# Patient Record
Sex: FEMALE | Race: WHITE | NOT HISPANIC OR LATINO | Employment: FULL TIME | URBAN - NONMETROPOLITAN AREA
[De-identification: names, ages, dates, MRNs, and addresses within clinical notes are randomized per-mention and may not be internally consistent; named-entity substitution may affect disease eponyms.]

---

## 2019-06-14 ENCOUNTER — OFFICE VISIT (OUTPATIENT)
Dept: URGENT CARE | Facility: CLINIC | Age: 29
End: 2019-06-14
Payer: COMMERCIAL

## 2019-06-14 VITALS
BODY MASS INDEX: 39.17 KG/M2 | WEIGHT: 264.44 LBS | TEMPERATURE: 98.6 F | RESPIRATION RATE: 14 BRPM | OXYGEN SATURATION: 96 % | SYSTOLIC BLOOD PRESSURE: 124 MMHG | DIASTOLIC BLOOD PRESSURE: 76 MMHG | HEIGHT: 69 IN | HEART RATE: 102 BPM

## 2019-06-14 DIAGNOSIS — J06.9 VIRAL URI WITH COUGH: ICD-10-CM

## 2019-06-14 PROCEDURE — 99203 OFFICE O/P NEW LOW 30 MIN: CPT | Performed by: FAMILY MEDICINE

## 2019-06-14 NOTE — PROGRESS NOTES
"Subjective:      Kelsey Penrose is a 28 y.o. female who presents with Pharyngitis            This is a new problem  28-year-old female presenting with 4-day history of cough, congestion, sore throat, some hoarseness this morning.  No fever reported.  She has not been using any over-the-counter medication        Review of Systems   All other systems reviewed and are negative.         Objective:     /76 (BP Location: Right arm, Patient Position: Sitting, BP Cuff Size: Large adult)   Pulse (!) 102   Temp 37 °C (98.6 °F) (Temporal)   Resp 14   Ht 1.74 m (5' 8.5\")   Wt 120 kg (264 lb 7.1 oz)   SpO2 96%   BMI 39.62 kg/m²      Physical Exam   Constitutional: She is oriented to person, place, and time. She appears well-developed and well-nourished.  Non-toxic appearance. No distress.   HENT:   Head: Normocephalic and atraumatic.   Right Ear: Tympanic membrane, external ear and ear canal normal.   Left Ear: Tympanic membrane, external ear and ear canal normal.   Nose: No rhinorrhea.   Mouth/Throat: Uvula is midline and oropharynx is clear and moist. No oral lesions. No uvula swelling. No oropharyngeal exudate, posterior oropharyngeal edema, posterior oropharyngeal erythema or tonsillar abscesses. No tonsillar exudate.   Eyes: Conjunctivae are normal.   Neck: Neck supple.   Cardiovascular: Regular rhythm.  Exam reveals no gallop and no friction rub.    No murmur heard.  Pulmonary/Chest: Effort normal. No stridor. No respiratory distress. She has no wheezes. She has no rales.   Lymphadenopathy:     She has no cervical adenopathy.   Neurological: She is alert and oriented to person, place, and time.   Skin: Skin is warm. No rash noted. She is not diaphoretic. No erythema. No pallor.   Psychiatric: She has a normal mood and affect.               Assessment/Plan:     1. Viral URI with cough    We discussed she has a viral illness and treatment is supportive.  Continue symptomatic care  Plan per orders and " instructions  Warning signs reviewed

## 2019-06-18 ENCOUNTER — OFFICE VISIT (OUTPATIENT)
Dept: URGENT CARE | Facility: CLINIC | Age: 29
End: 2019-06-18
Payer: COMMERCIAL

## 2019-06-18 VITALS
RESPIRATION RATE: 16 BRPM | OXYGEN SATURATION: 93 % | BODY MASS INDEX: 40.01 KG/M2 | DIASTOLIC BLOOD PRESSURE: 80 MMHG | HEART RATE: 96 BPM | HEIGHT: 68 IN | WEIGHT: 264 LBS | TEMPERATURE: 97.7 F | SYSTOLIC BLOOD PRESSURE: 114 MMHG

## 2019-06-18 DIAGNOSIS — J98.8 RTI (RESPIRATORY TRACT INFECTION): ICD-10-CM

## 2019-06-18 DIAGNOSIS — Z86.59 HISTORY OF BIPOLAR DISORDER: ICD-10-CM

## 2019-06-18 LAB
INT CON NEG: NORMAL
INT CON POS: NORMAL
S PYO AG THROAT QL: NEGATIVE

## 2019-06-18 PROCEDURE — 87880 STREP A ASSAY W/OPTIC: CPT | Performed by: FAMILY MEDICINE

## 2019-06-18 PROCEDURE — 99214 OFFICE O/P EST MOD 30 MIN: CPT | Performed by: FAMILY MEDICINE

## 2019-06-18 RX ORDER — BENZONATATE 100 MG/1
200 CAPSULE ORAL 3 TIMES DAILY PRN
Qty: 30 CAP | Refills: 1 | Status: SHIPPED | OUTPATIENT
Start: 2019-06-18 | End: 2021-06-22

## 2019-06-18 RX ORDER — PREDNISONE 20 MG/1
40 TABLET ORAL EVERY MORNING
Qty: 12 TAB | Refills: 0 | Status: SHIPPED | OUTPATIENT
Start: 2019-06-18 | End: 2019-06-18

## 2019-06-18 RX ORDER — AZITHROMYCIN 250 MG/1
TABLET, FILM COATED ORAL
Qty: 6 TAB | Refills: 0 | Status: SHIPPED | OUTPATIENT
Start: 2019-06-18 | End: 2019-06-18

## 2019-06-18 RX ORDER — AMOXICILLIN 875 MG/1
875 TABLET, COATED ORAL EVERY 12 HOURS
Qty: 20 TAB | Refills: 0 | Status: SHIPPED | OUTPATIENT
Start: 2019-06-18 | End: 2019-06-28

## 2019-06-18 ASSESSMENT — ENCOUNTER SYMPTOMS: SORE THROAT: 1

## 2019-06-18 NOTE — PROGRESS NOTES
"Subjective:      Kelsey Penrose is a 28 y.o. female who presents with Pharyngitis (was told that if she was not better by today to comne back, symptoms worsening, )      - This is a pleasant and non toxic appearing 28 y.o. female with c/o 1 wk cough/sinus. No NVFC. otc meds not helping           ALLERGIES:  Patient has no known allergies.     PMH:  History reviewed. No pertinent past medical history.     PSH:  History reviewed. No pertinent surgical history.    MEDS:    Current Outpatient Prescriptions:   •  benzonatate (TESSALON) 100 MG Cap, Take 2 Caps by mouth 3 times a day as needed for Cough., Disp: 30 Cap, Rfl: 1  •  amoxicillin (AMOXIL) 875 MG tablet, Take 1 Tab by mouth every 12 hours for 10 days., Disp: 20 Tab, Rfl: 0  •  sertraline (ZOLOFT) 50 MG Tab, Take 50 mg by mouth every day., Disp: , Rfl:     ** I have documented what I find to be significant in regards to past medical, social, family and surgical history  in my HPI or under PMH/PSH/FH review section, otherwise it is contributory **           HPI    Review of Systems   HENT: Positive for sore throat.    All other systems reviewed and are negative.         Objective:     /80   Pulse 96   Temp 36.5 °C (97.7 °F)   Resp 16   Ht 1.727 m (5' 8\")   Wt 119.7 kg (264 lb)   SpO2 93%   BMI 40.14 kg/m²      Physical Exam   Constitutional: She appears well-developed. No distress.   HENT:   Head: Normocephalic and atraumatic.   Mouth/Throat: Oropharynx is clear and moist.   Eyes: Conjunctivae are normal.   Neck: Neck supple.   Cardiovascular: Regular rhythm.    No murmur heard.  Pulmonary/Chest: Effort normal. No respiratory distress.   Neurological: She is alert. She exhibits normal muscle tone.   Skin: Skin is warm and dry.   Psychiatric: She has a normal mood and affect. Judgment normal.   Nursing note and vitals reviewed.              Assessment/Plan:         1. RTI (respiratory tract infection)  POCT Rapid Strep A    benzonatate (TESSALON) 100 " MG Cap    amoxicillin (AMOXIL) 875 MG tablet    DISCONTINUED: azithromycin (ZITHROMAX) 250 MG Tab    DISCONTINUED: predniSONE (DELTASONE) 20 MG Tab   2. History of bipolar disorder               Dx & d/c instructions discussed w/ patient and/or family members.     Follow up with PCP (or here if PCP unavailable) in 2-3 days if symptoms not improving, ER if feeling/getting worse.    Any realistic and/or common medication side effects that may have been given today(i.e. Rash, GI upset/constipation, sedation, elevation of BP or blood sugars) reviewed.     Patient left in stable condition

## 2021-06-22 ENCOUNTER — OFFICE VISIT (OUTPATIENT)
Dept: URGENT CARE | Facility: CLINIC | Age: 31
End: 2021-06-22
Payer: COMMERCIAL

## 2021-06-22 VITALS
BODY MASS INDEX: 41.57 KG/M2 | HEART RATE: 83 BPM | OXYGEN SATURATION: 96 % | DIASTOLIC BLOOD PRESSURE: 86 MMHG | RESPIRATION RATE: 16 BRPM | SYSTOLIC BLOOD PRESSURE: 124 MMHG | HEIGHT: 69 IN | TEMPERATURE: 97.6 F | WEIGHT: 280.7 LBS

## 2021-06-22 DIAGNOSIS — G43.819 OTHER MIGRAINE WITHOUT STATUS MIGRAINOSUS, INTRACTABLE: Primary | ICD-10-CM

## 2021-06-22 PROBLEM — R42 VERTIGO: Status: ACTIVE | Noted: 2020-11-09

## 2021-06-22 PROBLEM — G43.119 INTRACTABLE MIGRAINE WITH AURA WITHOUT STATUS MIGRAINOSUS: Status: ACTIVE | Noted: 2020-11-09

## 2021-06-22 LAB — GLUCOSE BLD-MCNC: 98 MG/DL (ref 70–100)

## 2021-06-22 PROCEDURE — 99214 OFFICE O/P EST MOD 30 MIN: CPT | Performed by: PHYSICIAN ASSISTANT

## 2021-06-22 PROCEDURE — 82962 GLUCOSE BLOOD TEST: CPT | Performed by: PHYSICIAN ASSISTANT

## 2021-06-22 RX ORDER — KETOROLAC TROMETHAMINE 30 MG/ML
30 INJECTION, SOLUTION INTRAMUSCULAR; INTRAVENOUS ONCE
Status: DISCONTINUED | OUTPATIENT
Start: 2021-06-22 | End: 2021-06-22

## 2021-06-22 ASSESSMENT — ENCOUNTER SYMPTOMS
HEADACHES: 1
VOMITING: 1
COUGH: 0
PHOTOPHOBIA: 1
VISUAL CHANGE: 0
NAUSEA: 1
FEVER: 0
DIZZINESS: 1
BLURRED VISION: 0
SORE THROAT: 0
MIGRAINE HEADACHES: 1

## 2021-06-22 NOTE — PROGRESS NOTES
Subjective:   Kelsey Penrose is a 30 y.o. female who presents for Headache (body aches, weakness, emesis x  3 days ago)        Headache   This is a new problem. Episode onset: 3 days  The problem occurs constantly. The problem has been unchanged. The pain is located in the bilateral region. The quality of the pain is described as band-like. Associated symptoms include dizziness, nausea, photophobia and vomiting (x 1 episode ). Pertinent negatives include no blurred vision, coughing, fever, muscle aches, phonophobia, sore throat or visual change. Treatments tried: ibuprofen  Her past medical history is significant for migraine headaches.      Pt had a dizziness episode last week she contributed to heat and menses.     The pt is concerned about her glucose after taking Risperdal previously. She d/c medication due to polydipsia concern for elevation in glucose. She would like glucose checked today. She continues to have intermittent polydipsia and recently had weight gain.     She does not currently have a pcp.     Hx of bipolar disorder     Review of Systems   Constitutional: Negative for fever.   HENT: Negative for sore throat.    Eyes: Positive for photophobia. Negative for blurred vision.   Respiratory: Negative for cough.    Gastrointestinal: Positive for nausea and vomiting (x 1 episode ).   Neurological: Positive for dizziness and headaches.       PMH:  has no past medical history on file.  MEDS:   Current Outpatient Medications:   •  sertraline (ZOLOFT) 50 MG Tab, Take 50 mg by mouth every day., Disp: , Rfl:   •  Multiple Vitamin (MULTIVITAMIN ADULT PO), Take  by mouth., Disp: , Rfl:   ALLERGIES:   Allergies   Allergen Reactions   • Avocado      swelling     SURGHX: No past surgical history on file.  SOCHX:  reports that she quit smoking about 2 years ago. Her smoking use included cigarettes. She smoked 0.25 packs per day. She uses smokeless tobacco. She reports current alcohol use. She reports that she does  "not use drugs.  No family history on file.     Objective:   /86 (BP Location: Right arm, Patient Position: Sitting)   Pulse 83   Temp 36.4 °C (97.6 °F) (Temporal)   Resp 16   Ht 1.753 m (5' 9\")   Wt (!) 127 kg (280 lb 11.2 oz)   SpO2 96%   BMI 41.45 kg/m²     Physical Exam  Vitals reviewed.   Constitutional:       General: She is not in acute distress.     Appearance: Normal appearance. She is well-developed. She is not ill-appearing.   HENT:      Head: Normocephalic and atraumatic.   Eyes:      General: No visual field deficit.     Extraocular Movements: Extraocular movements intact.      Pupils: Pupils are equal, round, and reactive to light.   Neck:      Trachea: No tracheal deviation.   Pulmonary:      Effort: Pulmonary effort is normal.   Musculoskeletal:      Cervical back: Normal range of motion and neck supple. No rigidity.   Skin:     General: Skin is warm and dry.      Capillary Refill: Capillary refill takes less than 2 seconds.   Neurological:      General: No focal deficit present.      Mental Status: She is alert and oriented to person, place, and time.      Cranial Nerves: Cranial nerves are intact. No cranial nerve deficit or facial asymmetry.      Sensory: Sensation is intact. No sensory deficit.      Motor: Motor function is intact. No weakness, abnormal muscle tone or pronator drift.      Coordination: Coordination is intact. Coordination normal. Finger-Nose-Finger Test normal.      Gait: Gait is intact. Gait normal.   Psychiatric:         Mood and Affect: Mood normal.         Behavior: Behavior normal.           Assessment/Plan:     1. Other migraine without status migrainosus, intractable  AMB REFERRAL TO ESTABLISH WITH [facility] PCP    POCT Glucose    DISCONTINUED: ketorolac (TORADOL) injection 30 mg     Fasting POCT glucose: 98    Neurologically intact.  Recommended Toradol IM in clinic patient declines medication today.  She is does have a history of multiple drug reactions " associated with her bipolar disorder.  Recommended increasing fluids, well-balanced diet, sleep hygiene.  She can continue ibuprofen/Tylenol.  Migraine educational handout provided.    Follow-up with primary care provider within 7-10 days, referral placed.  If symptoms worsen or persist patient can return to clinic for reevaluation. Patient confirmed understanding of information.    My total time spent caring for the patient on the day of the encounter was 30 minutes.   This does not include time spent on separately billable procedures/tests.      Please note that this dictation was created using voice recognition software. I have made every reasonable attempt to correct obvious errors, but I expect that there are errors of grammar and possibly content that I did not discover before finalizing the note.

## 2022-01-23 ENCOUNTER — OFFICE VISIT (OUTPATIENT)
Dept: URGENT CARE | Facility: CLINIC | Age: 32
End: 2022-01-23
Payer: COMMERCIAL

## 2022-01-23 VITALS
TEMPERATURE: 98.4 F | RESPIRATION RATE: 20 BRPM | SYSTOLIC BLOOD PRESSURE: 124 MMHG | BODY MASS INDEX: 40.73 KG/M2 | HEART RATE: 88 BPM | HEIGHT: 69 IN | OXYGEN SATURATION: 100 % | DIASTOLIC BLOOD PRESSURE: 68 MMHG | WEIGHT: 275 LBS

## 2022-01-23 DIAGNOSIS — U07.1 COVID-19 VIRUS INFECTION: ICD-10-CM

## 2022-01-23 DIAGNOSIS — R42 DIZZY: ICD-10-CM

## 2022-01-23 PROCEDURE — 99213 OFFICE O/P EST LOW 20 MIN: CPT | Mod: CS | Performed by: INTERNAL MEDICINE

## 2022-01-23 ASSESSMENT — ENCOUNTER SYMPTOMS
COUGH: 0
FEVER: 0
SHORTNESS OF BREATH: 1
VOMITING: 0
WHEEZING: 0
CHILLS: 0
NAUSEA: 0

## 2022-01-23 NOTE — PROGRESS NOTES
Chief Complaint:      HPI:      Kelsey Penrose is a 31 y.o. female with history of positive COVID test.  She reports that she tested positive after going to a drive-through testing center in [place] last Thursday.  She is reporting dizziness when she is speaking for a long time and standing up.  She denies shortness of breath at rest.  She denies cough, she denies chest pain, denies diarrhea, nausea, vomiting and sore throat.  Her vitals today are normal oxygen is 100% and she is not tachycardic or tachypneic and blood pressure is normal.  She has a history of bipolar disorder and obesity but no history of diabetes, chronic kidney disease, chronic lung disease and cancer.        ROS:   Review of Systems   Constitutional: Negative for chills and fever.   Respiratory: Positive for shortness of breath. Negative for cough and wheezing.    Gastrointestinal: Negative for nausea and vomiting.        Past Medical History:  She   Patient Active Problem List    Diagnosis Date Noted   • Vertigo 2020   • Intractable migraine with aura without status migrainosus 2020   • History of bipolar disorder 2019     Past Surgical History:  She History reviewed. No pertinent surgical history.   Allergies:  She Avocado   Current Medications:  She   Current Outpatient Medications:   •  sertraline (ZOLOFT) 50 MG Tab, Take 50 mg by mouth every day., Disp: , Rfl:   •  Multiple Vitamin (MULTIVITAMIN ADULT PO), Take  by mouth. (Patient not taking: Reported on 2022), Disp: , Rfl:   Social History:  She   Social History     Socioeconomic History   • Marital status: Single     Spouse name: Not on file   • Number of children: Not on file   • Years of education: Not on file   • Highest education level: Not on file   Occupational History   • Not on file   Tobacco Use   • Smoking status: Former Smoker     Packs/day: 0.25     Types: Cigarettes     Quit date: 2019     Years since quittin.7   • Smokeless tobacco: Current User  "  Vaping Use   • Vaping Use: Never used   Substance and Sexual Activity   • Alcohol use: Not Currently     Comment: OCC   • Drug use: No   • Sexual activity: Not on file   Other Topics Concern   • Not on file   Social History Narrative   • Not on file     Social Determinants of Health     Financial Resource Strain:    • Difficulty of Paying Living Expenses: Not on file   Food Insecurity:    • Worried About Running Out of Food in the Last Year: Not on file   • Ran Out of Food in the Last Year: Not on file   Transportation Needs:    • Lack of Transportation (Medical): Not on file   • Lack of Transportation (Non-Medical): Not on file   Physical Activity:    • Days of Exercise per Week: Not on file   • Minutes of Exercise per Session: Not on file   Stress:    • Feeling of Stress : Not on file   Social Connections:    • Frequency of Communication with Friends and Family: Not on file   • Frequency of Social Gatherings with Friends and Family: Not on file   • Attends [employer] Services: Not on file   • Active Member of Clubs or Organizations: Not on file   • Attends Club or Organization Meetings: Not on file   • Marital Status: Not on file   Intimate Partner Violence:    • Fear of Current or Ex-Partner: Not on file   • Emotionally Abused: Not on file   • Physically Abused: Not on file   • Sexually Abused: Not on file   Housing Stability:    • Unable to Pay for Housing in the Last Year: Not on file   • Number of Places Lived in the Last Year: Not on file   • Unstable Housing in the Last Year: Not on file      Family History:   Her History reviewed. No pertinent family history.     PHYSICAL EXAM:    Vital signs: /68 (BP Location: Left arm, Patient Position: Sitting, BP Cuff Size: Adult long)   Pulse 88   Temp 36.9 °C (98.4 °F) (Temporal)   Resp 20   Ht 1.753 m (5' 9\")   Wt 125 kg (275 lb)   SpO2 100%   Breastfeeding No   BMI 40.61 kg/m²   Physical Exam  Constitutional:       Appearance: She is obese.   HENT:    "   Head: Normocephalic and atraumatic.      Nose: Nose normal.      Mouth/Throat:      Mouth: Mucous membranes are moist.      Pharynx: Oropharynx is clear. No oropharyngeal exudate or posterior oropharyngeal erythema.   Eyes:      Extraocular Movements: Extraocular movements intact.      Conjunctiva/sclera: Conjunctivae normal.      Pupils: Pupils are equal, round, and reactive to light.   Cardiovascular:      Rate and Rhythm: Normal rate and regular rhythm.      Pulses: Normal pulses.      Heart sounds: Normal heart sounds.   Pulmonary:      Effort: Pulmonary effort is normal.      Breath sounds: Normal breath sounds.   Musculoskeletal:      Cervical back: Normal range of motion and neck supple.   Neurological:      Mental Status: She is alert.         Labs:  No results found for: HBA1C   No results found for: CHOLSTRLTOT, TRIGLYCERIDE, HDL, LDL, CHOLHDLRAT, NONHDL  No results found for: MICROALBCALC, MALBCRT, MALBEXCR, XGXGPH64, MICROALBUR, MICRALB, UMICROALBUM, MICROALBTIM   No results found for: CREATININE        ASSESSMENT/PLAN:   1. COVID-19 virus infection  Recommend supportive care management for COVID-19 infection  There is no indication for ED evaluation at this time  Reviewed ER visit precautions which include shortness of breath at rest, difficulty speaking or finishing sentences, hypoxemia or pulse ox less than or equal to 90%, or signs and symptoms of hypoxemia or hypoperfusion such as dizziness, lightheadedness  At this time the patient is complaining of shortness of breath however her oxygen is 100% and she is speaking in sentences.  I explained to her that her symptoms are highly subjective but objectively there is no evidence of hemodynamic or pulmonary compromise  I explained to her that there is no indication to start her on antiviral at this time and we do not prescribe antibiotics for COVID-19    2. Dizzy  Patient does complain of intermittent dizziness however I suspect that this is related to  anxiety  Her blood pressure today stable  We discussed ER visit precautions for COVID-19      Return if symptoms worsen or fail to improve.      Differential diagnosis, natural history, supportive care, and indications for immediate follow-up discussed at length.   Instructed to return to  or nearest emergency department if symptoms fail to improve, for any change in condition, further concerns, or new concerning symptoms.    Patient states understanding of the plan of care and discharge instructions.      Eladio Carl MD, FACE, ECNU  01/23/22

## 2023-07-09 ENCOUNTER — HOSPITAL ENCOUNTER (OUTPATIENT)
Dept: RADIOLOGY | Facility: MEDICAL CENTER | Age: 33
End: 2023-07-09
Attending: MIDWIFE
Payer: COMMERCIAL

## 2023-07-09 DIAGNOSIS — Z34.81 PRENATAL CARE, SUBSEQUENT PREGNANCY, FIRST TRIMESTER: ICD-10-CM

## 2023-07-09 PROCEDURE — 76801 OB US < 14 WKS SINGLE FETUS: CPT

## 2023-10-26 ENCOUNTER — APPOINTMENT (OUTPATIENT)
Dept: RADIOLOGY | Facility: MEDICAL CENTER | Age: 33
End: 2023-10-26
Attending: MIDWIFE
Payer: COMMERCIAL

## 2023-10-26 DIAGNOSIS — Z34.02 ENCOUNTER FOR SUPERVISION OF NORMAL FIRST PREGNANCY IN SECOND TRIMESTER: ICD-10-CM

## 2023-10-26 PROCEDURE — 76805 OB US >/= 14 WKS SNGL FETUS: CPT

## 2023-11-13 ENCOUNTER — HOSPITAL ENCOUNTER (OUTPATIENT)
Dept: RADIOLOGY | Facility: MEDICAL CENTER | Age: 33
End: 2023-11-13
Attending: MIDWIFE
Payer: COMMERCIAL

## 2023-11-13 DIAGNOSIS — Z34.82 PRENATAL CARE, SUBSEQUENT PREGNANCY, SECOND TRIMESTER: ICD-10-CM

## 2023-11-13 PROCEDURE — 76816 OB US FOLLOW-UP PER FETUS: CPT

## 2024-01-18 ENCOUNTER — APPOINTMENT (OUTPATIENT)
Dept: RADIOLOGY | Facility: MEDICAL CENTER | Age: 34
End: 2024-01-18
Attending: MIDWIFE
Payer: COMMERCIAL

## 2024-01-18 DIAGNOSIS — Z34.03 ENCOUNTER FOR SUPERVISION OF NORMAL FIRST PREGNANCY IN THIRD TRIMESTER: ICD-10-CM

## 2024-01-18 PROCEDURE — 76816 OB US FOLLOW-UP PER FETUS: CPT

## 2024-02-07 ENCOUNTER — HOSPITAL ENCOUNTER (OUTPATIENT)
Facility: MEDICAL CENTER | Age: 34
End: 2024-02-07
Attending: MIDWIFE
Payer: COMMERCIAL

## 2024-02-07 PROCEDURE — 87081 CULTURE SCREEN ONLY: CPT

## 2024-02-07 PROCEDURE — 87150 DNA/RNA AMPLIFIED PROBE: CPT

## 2024-02-08 LAB — AMBIGUOUS DTTM AMBI4: NORMAL

## 2024-02-09 LAB — GP B STREP DNA SPEC QL NAA+PROBE: POSITIVE

## 2024-02-12 ENCOUNTER — HOSPITAL ENCOUNTER (INPATIENT)
Facility: MEDICAL CENTER | Age: 34
LOS: 4 days | End: 2024-02-16
Attending: OBSTETRICS & GYNECOLOGY | Admitting: STUDENT IN AN ORGANIZED HEALTH CARE EDUCATION/TRAINING PROGRAM
Payer: COMMERCIAL

## 2024-02-12 LAB
ABO GROUP BLD: NORMAL
ALBUMIN SERPL BCP-MCNC: 3.4 G/DL (ref 3.2–4.9)
ALBUMIN/GLOB SERPL: 1.1 G/DL
ALP SERPL-CCNC: 157 U/L (ref 30–99)
ALT SERPL-CCNC: 8 U/L (ref 2–50)
ANION GAP SERPL CALC-SCNC: 13 MMOL/L (ref 7–16)
AST SERPL-CCNC: 13 U/L (ref 12–45)
BASOPHILS # BLD AUTO: 0.1 % (ref 0–1.8)
BASOPHILS # BLD: 0.01 K/UL (ref 0–0.12)
BILIRUB SERPL-MCNC: 0.2 MG/DL (ref 0.1–1.5)
BLD GP AB SCN SERPL QL: NORMAL
BUN SERPL-MCNC: 12 MG/DL (ref 8–22)
CALCIUM ALBUM COR SERPL-MCNC: 9.6 MG/DL (ref 8.5–10.5)
CALCIUM SERPL-MCNC: 9.1 MG/DL (ref 8.5–10.5)
CHLORIDE SERPL-SCNC: 104 MMOL/L (ref 96–112)
CO2 SERPL-SCNC: 20 MMOL/L (ref 20–33)
CREAT SERPL-MCNC: 0.62 MG/DL (ref 0.5–1.4)
CREAT UR-MCNC: 69.96 MG/DL
EOSINOPHIL # BLD AUTO: 0.2 K/UL (ref 0–0.51)
EOSINOPHIL NFR BLD: 2.5 % (ref 0–6.9)
ERYTHROCYTE [DISTWIDTH] IN BLOOD BY AUTOMATED COUNT: 47.2 FL (ref 35.9–50)
GFR SERPLBLD CREATININE-BSD FMLA CKD-EPI: 122 ML/MIN/1.73 M 2
GLOBULIN SER CALC-MCNC: 3.1 G/DL (ref 1.9–3.5)
GLUCOSE SERPL-MCNC: 89 MG/DL (ref 65–99)
HCT VFR BLD AUTO: 36.5 % (ref 37–47)
HGB BLD-MCNC: 12.2 G/DL (ref 12–16)
IMM GRANULOCYTES # BLD AUTO: 0.05 K/UL (ref 0–0.11)
IMM GRANULOCYTES NFR BLD AUTO: 0.6 % (ref 0–0.9)
LYMPHOCYTES # BLD AUTO: 1.24 K/UL (ref 1–4.8)
LYMPHOCYTES NFR BLD: 15.3 % (ref 22–41)
MCH RBC QN AUTO: 28.2 PG (ref 27–33)
MCHC RBC AUTO-ENTMCNC: 33.4 G/DL (ref 32.2–35.5)
MCV RBC AUTO: 84.5 FL (ref 81.4–97.8)
MONOCYTES # BLD AUTO: 0.68 K/UL (ref 0–0.85)
MONOCYTES NFR BLD AUTO: 8.4 % (ref 0–13.4)
NEUTROPHILS # BLD AUTO: 5.93 K/UL (ref 1.82–7.42)
NEUTROPHILS NFR BLD: 73.1 % (ref 44–72)
NRBC # BLD AUTO: 0 K/UL
NRBC BLD-RTO: 0 /100 WBC (ref 0–0.2)
PLATELET # BLD AUTO: 213 K/UL (ref 164–446)
PMV BLD AUTO: 10.8 FL (ref 9–12.9)
POTASSIUM SERPL-SCNC: 4.1 MMOL/L (ref 3.6–5.5)
PROT SERPL-MCNC: 6.5 G/DL (ref 6–8.2)
PROT UR-MCNC: 6 MG/DL (ref 0–15)
PROT/CREAT UR: 86 MG/G (ref 10–107)
RBC # BLD AUTO: 4.32 M/UL (ref 4.2–5.4)
RH BLD: NORMAL
SODIUM SERPL-SCNC: 137 MMOL/L (ref 135–145)
T PALLIDUM AB SER QL IA: NORMAL
WBC # BLD AUTO: 8.1 K/UL (ref 4.8–10.8)

## 2024-02-12 PROCEDURE — 82570 ASSAY OF URINE CREATININE: CPT

## 2024-02-12 PROCEDURE — 86901 BLOOD TYPING SEROLOGIC RH(D): CPT

## 2024-02-12 PROCEDURE — 700101 HCHG RX REV CODE 250: Performed by: NURSE PRACTITIONER

## 2024-02-12 PROCEDURE — A9270 NON-COVERED ITEM OR SERVICE: HCPCS | Performed by: STUDENT IN AN ORGANIZED HEALTH CARE EDUCATION/TRAINING PROGRAM

## 2024-02-12 PROCEDURE — 99283 EMERGENCY DEPT VISIT LOW MDM: CPT

## 2024-02-12 PROCEDURE — 770002 HCHG ROOM/CARE - OB PRIVATE (112)

## 2024-02-12 PROCEDURE — 700102 HCHG RX REV CODE 250 W/ 637 OVERRIDE(OP): Performed by: NURSE PRACTITIONER

## 2024-02-12 PROCEDURE — A9270 NON-COVERED ITEM OR SERVICE: HCPCS | Performed by: NURSE PRACTITIONER

## 2024-02-12 PROCEDURE — 3E0P7VZ INTRODUCTION OF HORMONE INTO FEMALE REPRODUCTIVE, VIA NATURAL OR ARTIFICIAL OPENING: ICD-10-PCS | Performed by: STUDENT IN AN ORGANIZED HEALTH CARE EDUCATION/TRAINING PROGRAM

## 2024-02-12 PROCEDURE — 84156 ASSAY OF PROTEIN URINE: CPT

## 2024-02-12 PROCEDURE — 86780 TREPONEMA PALLIDUM: CPT

## 2024-02-12 PROCEDURE — 36415 COLL VENOUS BLD VENIPUNCTURE: CPT

## 2024-02-12 PROCEDURE — 85025 COMPLETE CBC W/AUTO DIFF WBC: CPT

## 2024-02-12 PROCEDURE — 700102 HCHG RX REV CODE 250 W/ 637 OVERRIDE(OP): Performed by: STUDENT IN AN ORGANIZED HEALTH CARE EDUCATION/TRAINING PROGRAM

## 2024-02-12 PROCEDURE — 86900 BLOOD TYPING SEROLOGIC ABO: CPT

## 2024-02-12 PROCEDURE — 86850 RBC ANTIBODY SCREEN: CPT

## 2024-02-12 PROCEDURE — 59025 FETAL NON-STRESS TEST: CPT

## 2024-02-12 PROCEDURE — 80053 COMPREHEN METABOLIC PANEL: CPT

## 2024-02-12 RX ORDER — ACETAMINOPHEN 500 MG
1000 TABLET ORAL ONCE
Status: COMPLETED | OUTPATIENT
Start: 2024-02-12 | End: 2024-02-12

## 2024-02-12 RX ORDER — ONDANSETRON 4 MG/1
4 TABLET, ORALLY DISINTEGRATING ORAL EVERY 6 HOURS PRN
Status: DISCONTINUED | OUTPATIENT
Start: 2024-02-12 | End: 2024-02-14 | Stop reason: HOSPADM

## 2024-02-12 RX ORDER — CARBOPROST TROMETHAMINE 250 UG/ML
250 INJECTION, SOLUTION INTRAMUSCULAR
Status: DISCONTINUED | OUTPATIENT
Start: 2024-02-12 | End: 2024-02-14 | Stop reason: HOSPADM

## 2024-02-12 RX ORDER — IBUPROFEN 800 MG/1
800 TABLET ORAL
Status: COMPLETED | OUTPATIENT
Start: 2024-02-12 | End: 2024-02-14

## 2024-02-12 RX ORDER — ONDANSETRON 2 MG/ML
4 INJECTION INTRAMUSCULAR; INTRAVENOUS EVERY 6 HOURS PRN
Status: DISCONTINUED | OUTPATIENT
Start: 2024-02-12 | End: 2024-02-14 | Stop reason: HOSPADM

## 2024-02-12 RX ORDER — TERBUTALINE SULFATE 1 MG/ML
0.25 INJECTION, SOLUTION SUBCUTANEOUS
Status: DISCONTINUED | OUTPATIENT
Start: 2024-02-12 | End: 2024-02-14 | Stop reason: HOSPADM

## 2024-02-12 RX ORDER — OMEPRAZOLE 20 MG/1
20 CAPSULE, DELAYED RELEASE ORAL DAILY
Status: DISCONTINUED | OUTPATIENT
Start: 2024-02-12 | End: 2024-02-16 | Stop reason: HOSPADM

## 2024-02-12 RX ORDER — DIPHENHYDRAMINE HCL 25 MG
25 TABLET ORAL EVERY 6 HOURS PRN
Status: ON HOLD | COMMUNITY
End: 2024-02-15

## 2024-02-12 RX ORDER — ALUMINA, MAGNESIA, AND SIMETHICONE 2400; 2400; 240 MG/30ML; MG/30ML; MG/30ML
30 SUSPENSION ORAL EVERY 6 HOURS PRN
Status: DISCONTINUED | OUTPATIENT
Start: 2024-02-12 | End: 2024-02-14 | Stop reason: HOSPADM

## 2024-02-12 RX ORDER — NIFEDIPINE 10 MG/1
10 CAPSULE ORAL
Status: DISCONTINUED | OUTPATIENT
Start: 2024-02-12 | End: 2024-02-14 | Stop reason: HOSPADM

## 2024-02-12 RX ORDER — MISOPROSTOL 200 UG/1
800 TABLET ORAL
Status: DISCONTINUED | OUTPATIENT
Start: 2024-02-12 | End: 2024-02-14 | Stop reason: HOSPADM

## 2024-02-12 RX ORDER — HYDRALAZINE HYDROCHLORIDE 20 MG/ML
5-10 INJECTION INTRAMUSCULAR; INTRAVENOUS
Status: DISCONTINUED | OUTPATIENT
Start: 2024-02-12 | End: 2024-02-14 | Stop reason: HOSPADM

## 2024-02-12 RX ORDER — OXYTOCIN 10 [USP'U]/ML
10 INJECTION, SOLUTION INTRAMUSCULAR; INTRAVENOUS
Status: DISCONTINUED | OUTPATIENT
Start: 2024-02-12 | End: 2024-02-14 | Stop reason: HOSPADM

## 2024-02-12 RX ORDER — SODIUM CHLORIDE, SODIUM LACTATE, POTASSIUM CHLORIDE, CALCIUM CHLORIDE 600; 310; 30; 20 MG/100ML; MG/100ML; MG/100ML; MG/100ML
INJECTION, SOLUTION INTRAVENOUS CONTINUOUS
Status: DISCONTINUED | OUTPATIENT
Start: 2024-02-12 | End: 2024-02-15

## 2024-02-12 RX ORDER — OMEPRAZOLE 20 MG/1
20 CAPSULE, DELAYED RELEASE ORAL DAILY
Status: ON HOLD | COMMUNITY
End: 2024-02-15

## 2024-02-12 RX ORDER — LABETALOL HYDROCHLORIDE 5 MG/ML
20-80 INJECTION, SOLUTION INTRAVENOUS
Status: DISCONTINUED | OUTPATIENT
Start: 2024-02-12 | End: 2024-02-14 | Stop reason: HOSPADM

## 2024-02-12 RX ORDER — LIDOCAINE HYDROCHLORIDE 10 MG/ML
20 INJECTION, SOLUTION INFILTRATION; PERINEURAL
Status: DISCONTINUED | OUTPATIENT
Start: 2024-02-12 | End: 2024-02-14 | Stop reason: HOSPADM

## 2024-02-12 RX ORDER — HYDROXYZINE 50 MG/1
50 TABLET, FILM COATED ORAL EVERY 6 HOURS PRN
Status: DISCONTINUED | OUTPATIENT
Start: 2024-02-12 | End: 2024-02-14 | Stop reason: HOSPADM

## 2024-02-12 RX ORDER — ACETAMINOPHEN 500 MG
1000 TABLET ORAL
Status: COMPLETED | OUTPATIENT
Start: 2024-02-12 | End: 2024-02-13

## 2024-02-12 RX ADMIN — DIPHENHYDRAMINE HCL 12.5 MG: 12.5 LIQUID ORAL at 23:38

## 2024-02-12 RX ADMIN — ACETAMINOPHEN 1000 MG: 500 TABLET ORAL at 16:57

## 2024-02-12 RX ADMIN — SERTRALINE HYDROCHLORIDE 75 MG: 50 TABLET ORAL at 22:42

## 2024-02-12 RX ADMIN — DINOPROSTONE 10 MG: 10 INSERT VAGINAL at 20:45

## 2024-02-12 RX ADMIN — OMEPRAZOLE 20 MG: 20 CAPSULE, DELAYED RELEASE ORAL at 23:06

## 2024-02-12 ASSESSMENT — PAIN DESCRIPTION - PAIN TYPE
TYPE: ACUTE PAIN

## 2024-02-12 ASSESSMENT — LIFESTYLE VARIABLES
EVER_SMOKED: NEVER
DOES PATIENT WANT TO STOP DRINKING: NO
CONSUMPTION TOTAL: INCOMPLETE
TOTAL SCORE: 0
ALCOHOL_USE: NO
HAVE PEOPLE ANNOYED YOU BY CRITICIZING YOUR DRINKING: NO
TOTAL SCORE: 0
EVER HAD A DRINK FIRST THING IN THE MORNING TO STEADY YOUR NERVES TO GET RID OF A HANGOVER: NO
HAVE YOU EVER FELT YOU SHOULD CUT DOWN ON YOUR DRINKING: NO
TOTAL SCORE: 0
EVER FELT BAD OR GUILTY ABOUT YOUR DRINKING: NO

## 2024-02-12 ASSESSMENT — PATIENT HEALTH QUESTIONNAIRE - PHQ9
SUM OF ALL RESPONSES TO PHQ9 QUESTIONS 1 AND 2: 0
2. FEELING DOWN, DEPRESSED, IRRITABLE, OR HOPELESS: NOT AT ALL
1. LITTLE INTEREST OR PLEASURE IN DOING THINGS: NOT AT ALL

## 2024-02-12 ASSESSMENT — FIBROSIS 4 INDEX: FIB4 SCORE: 0.5

## 2024-02-12 ASSESSMENT — PAIN SCALES - GENERAL: PAINLEVEL: 3

## 2024-02-12 NOTE — PROGRESS NOTES
1411: Pt arrived from midwife appointment with elevated BP.   Pt states she is  not feeling contractions, no leaking of fluid, no vaginal bleeding, and pt reports fetal movement is normal.   Pt states she has had a mild to moderate headache over the past 4 days, no acute edema, some discomfort in L/R upper quadrant of abdomen but nothing sharp/ continuous, and no changes to vision.  External toco and FHT monitor applied.    1530: Report to Dr. Cheng     1535: Dr. Cheng at bedside to discuss POC with patient     1555: SVE     1640: Dr. Cheng at bedside to confirm presentation via US- vertex     2000: Report to Lashaun JUAREZ RN

## 2024-02-13 PROCEDURE — 10H07YZ INSERTION OF OTHER DEVICE INTO PRODUCTS OF CONCEPTION, VIA NATURAL OR ARTIFICIAL OPENING: ICD-10-PCS | Performed by: FAMILY MEDICINE

## 2024-02-13 PROCEDURE — 700102 HCHG RX REV CODE 250 W/ 637 OVERRIDE(OP): Performed by: NURSE PRACTITIONER

## 2024-02-13 PROCEDURE — A9270 NON-COVERED ITEM OR SERVICE: HCPCS | Performed by: FAMILY MEDICINE

## 2024-02-13 PROCEDURE — 700105 HCHG RX REV CODE 258: Performed by: STUDENT IN AN ORGANIZED HEALTH CARE EDUCATION/TRAINING PROGRAM

## 2024-02-13 PROCEDURE — 700111 HCHG RX REV CODE 636 W/ 250 OVERRIDE (IP): Performed by: STUDENT IN AN ORGANIZED HEALTH CARE EDUCATION/TRAINING PROGRAM

## 2024-02-13 PROCEDURE — 700105 HCHG RX REV CODE 258: Performed by: FAMILY MEDICINE

## 2024-02-13 PROCEDURE — 3E033VJ INTRODUCTION OF OTHER HORMONE INTO PERIPHERAL VEIN, PERCUTANEOUS APPROACH: ICD-10-PCS | Performed by: FAMILY MEDICINE

## 2024-02-13 PROCEDURE — 700102 HCHG RX REV CODE 250 W/ 637 OVERRIDE(OP): Performed by: STUDENT IN AN ORGANIZED HEALTH CARE EDUCATION/TRAINING PROGRAM

## 2024-02-13 PROCEDURE — 770002 HCHG ROOM/CARE - OB PRIVATE (112)

## 2024-02-13 PROCEDURE — 700105 HCHG RX REV CODE 258: Performed by: NURSE PRACTITIONER

## 2024-02-13 PROCEDURE — A9270 NON-COVERED ITEM OR SERVICE: HCPCS | Performed by: NURSE PRACTITIONER

## 2024-02-13 PROCEDURE — 99999 PR NO CHARGE: CPT | Performed by: FAMILY MEDICINE

## 2024-02-13 PROCEDURE — 700111 HCHG RX REV CODE 636 W/ 250 OVERRIDE (IP): Performed by: FAMILY MEDICINE

## 2024-02-13 PROCEDURE — 700102 HCHG RX REV CODE 250 W/ 637 OVERRIDE(OP): Performed by: FAMILY MEDICINE

## 2024-02-13 PROCEDURE — 700111 HCHG RX REV CODE 636 W/ 250 OVERRIDE (IP): Mod: JZ | Performed by: NURSE PRACTITIONER

## 2024-02-13 PROCEDURE — A9270 NON-COVERED ITEM OR SERVICE: HCPCS | Performed by: STUDENT IN AN ORGANIZED HEALTH CARE EDUCATION/TRAINING PROGRAM

## 2024-02-13 RX ORDER — ACETAMINOPHEN 500 MG
1000 TABLET ORAL EVERY 6 HOURS PRN
Status: DISCONTINUED | OUTPATIENT
Start: 2024-02-13 | End: 2024-02-14

## 2024-02-13 RX ADMIN — FENTANYL CITRATE 100 MCG: 50 INJECTION, SOLUTION INTRAMUSCULAR; INTRAVENOUS at 15:27

## 2024-02-13 RX ADMIN — ONDANSETRON 4 MG: 4 TABLET, ORALLY DISINTEGRATING ORAL at 16:11

## 2024-02-13 RX ADMIN — SODIUM CHLORIDE 5 MILLION UNITS: 900 INJECTION INTRAVENOUS at 21:50

## 2024-02-13 RX ADMIN — MISOPROSTOL 25 MCG: 100 TABLET ORAL at 09:29

## 2024-02-13 RX ADMIN — MISOPROSTOL 25 MCG: 100 TABLET ORAL at 13:03

## 2024-02-13 RX ADMIN — ACETAMINOPHEN 1000 MG: 500 TABLET ORAL at 03:39

## 2024-02-13 RX ADMIN — OMEPRAZOLE 20 MG: 20 CAPSULE, DELAYED RELEASE ORAL at 20:46

## 2024-02-13 RX ADMIN — HYDROXYZINE HYDROCHLORIDE 50 MG: 50 TABLET, FILM COATED ORAL at 20:47

## 2024-02-13 RX ADMIN — ACETAMINOPHEN 1000 MG: 500 TABLET ORAL at 20:23

## 2024-02-13 RX ADMIN — ACETAMINOPHEN 1000 MG: 500 TABLET ORAL at 13:12

## 2024-02-13 RX ADMIN — SODIUM CHLORIDE, POTASSIUM CHLORIDE, SODIUM LACTATE AND CALCIUM CHLORIDE: 600; 310; 30; 20 INJECTION, SOLUTION INTRAVENOUS at 00:15

## 2024-02-13 RX ADMIN — OXYTOCIN 2 MILLI-UNITS/MIN: 10 INJECTION, SOLUTION INTRAMUSCULAR; INTRAVENOUS at 20:34

## 2024-02-13 RX ADMIN — SERTRALINE HYDROCHLORIDE 75 MG: 50 TABLET ORAL at 20:46

## 2024-02-13 ASSESSMENT — PAIN DESCRIPTION - PAIN TYPE
TYPE: ACUTE PAIN

## 2024-02-13 ASSESSMENT — PATIENT HEALTH QUESTIONNAIRE - PHQ9
1. LITTLE INTEREST OR PLEASURE IN DOING THINGS: NOT AT ALL
2. FEELING DOWN, DEPRESSED, IRRITABLE, OR HOPELESS: NOT AT ALL
SUM OF ALL RESPONSES TO PHQ9 QUESTIONS 1 AND 2: 0

## 2024-02-13 NOTE — PROGRESS NOTES
1900: Care assumed of Mrs. Kelsey Penrose. Pt moved over to labor room 222. Orientated to unit. EFM and TOCO applied. All questions and concerns answered.     2000: Report received from MICHAEL Castrejon.    2045: Alejandrina HODGE to bedside. Cervidil placed.

## 2024-02-13 NOTE — PROGRESS NOTES
"[facility]  Labor & Delivery Progress Note    Kelsey Penrose is a 33 y.o. female  at 38w0d by stated dates who presents as transfer of care for gestational hypertension.    Subjective:  Pt Is doing well. She has discomfort with the vaginal exams. She is considering epidural. Cervadil was removed and pt SVE 1.5/50/-3, intact. She would like to try cytotec before balloon placement with pitocin. Pt has had two oral cytotec and no cervical change. Pt was amenable to Cook balloon placement, pt was given fentanyl 100mcg IV prior to procedure.     Uterine contractions: irregular, not painful  Leakage of fluid: denies  Vaginal bleeding: denies  Fetal movement: affirms      Objective:   BP (!) 145/91   Pulse 96   Temp 36 °C (96.8 °F) (Temporal)   Resp 20   Ht 1.753 m (5' 9\")   Wt (!) 134 kg (295 lb)   SpO2 97%     GEN: NAD, alert, calm, cooperative, laying comfortably in bed  HEENT: NCAT, EOMI  CV: warm and well perfused, trace BLE edema, radial pulses 2+  RESP: no cough, breathing comfortably on RA  ABS: soft, NTTP, gravid  : no lesions  MSK: moving all extremities    FHT: Cat I, baseline , variability moderate, +accels, -decels, +UC irregular    SVE: 1.5/50/-3    AROM: no  IUPC: no  FSE no  Epidural: no    Meds:     Current Facility-Administered Medications:     acetaminophen (Tylenol) tablet 1,000 mg, 1,000 mg, Oral, Q6HRS PRN, Kindra Parkinson M.D., 1,000 mg at 24 1312    LR infusion, , Intravenous, Continuous, Geri Cheng M.D., Last Rate: 125 mL/hr at 24 0700, Restarted at 24 0700    lidocaine (XYLOCAINE) 1%  injection, 20 mL, Subcutaneous, Once PRN, Geri Cheng M.D.    terbutaline (Brethine) injection 0.25 mg, 0.25 mg, Subcutaneous, Once PRN, Geri Cheng M.D.    oxytocin (Pitocin) infusion bolus (for post delivery), 20 Units, Intravenous, Once, Held at 24 5245 **FOLLOWED BY** oxytocin (Pitocin) infusion (for post delivery), 125 mL/hr, Intravenous, " Continuous, eGri Cheng M.D., Held at 02/12/24 1930    oxytocin (Pitocin) injection 10 Units, 10 Units, Intramuscular, Once PRN, Geri Cheng M.D.    ibuprofen (Motrin) tablet 800 mg, 800 mg, Oral, Once PRN, Geri Cheng M.D.    fentaNYL (Sublimaze) injection 50 mcg, 50 mcg, Intravenous, Q HOUR PRN **OR** fentaNYL (Sublimaze) injection 100 mcg, 100 mcg, Intravenous, Q HOUR PRN, Geri Cheng M.D., 100 mcg at 02/13/24 1527    penicillin G potassium 5 Million Units in  mL IVPB, 5 Million Units, Intravenous, Once, Held at 02/12/24 1845 **FOLLOWED BY** penicillin G potassium 2.5 million units in  mL IVPB, 2.5 Million Units, Intravenous, Q4HR, Geri Cheng M.D., Held at 02/12/24 2300    NIFEdipine IR (Procardia) capsule 10 mg, 10 mg, Oral, Q20MIN PRN **OR** labetalol (Normodyne/Trandate) injection 20-80 mg, 20-80 mg, Intravenous, Q10 MIN PRN **OR** hydrALAZINE (Apresoline) injection 5-10 mg, 5-10 mg, Intravenous, Q20MIN PRN, Geri Cheng M.D.    ondansetron (Zofran ODT) dispertab 4 mg, 4 mg, Oral, Q6HRS PRN **OR** ondansetron (Zofran) syringe/vial injection 4 mg, 4 mg, Intravenous, Q6HRS PRN, Geri Cheng M.D.    mag hydrox-al hydrox-simeth (Maalox Plus Es Or Mylanta Ds) suspension 30 mL, 30 mL, Oral, Q6HRS PRN, Geri Cheng M.D.    hydrOXYzine HCl (Atarax) tablet 50 mg, 50 mg, Oral, Q6HRS PRN, Geri Cheng M.D.    miSOPROStol (Cytotec) tablet 800 mcg, 800 mcg, Rectal, Once PRN, Geri Cheng M.D.    carboPROST (Hemabate) injection 250 mcg, 250 mcg, Intramuscular, Once PRN, Geri Cheng M.D.    sertraline (Zoloft) tablet 75 mg, 75 mg, Oral, Nightly, Alejandrina Uribe, A.P.R.N., 75 mg at 02/12/24 2242    diphenhydrAMINE (Benadryl) 12.5 MG/5ML liquid 12.5 mg, 12.5 mg, Oral, Nightly, Alejandrina Uribe, A.P.R.N., 12.5 mg at 02/12/24 2338    omeprazole (PriLOSEC) capsule 20 mg, 20 mg, Oral, DAILY, Alejandrina Uribe, A.P.R.N., 20 mg at 02/12/24 2306     Labs:  Recent  Results (from the past 24 hour(s))   POCT FETAL NONSTRESS TEST    Collection Time    13  1:15 PM       Component Value Range    NST Indications SUA, SGA      NST Baseline 130      NST Uterine Activity none      NST Acoustic Stimulation vas      NST Assessment reactive      NST Action Necessary        NST Other Data        NST Return        NST Read By         Recent Labs     24  1701 24  1800   ABOGROUP  --  O   ABSCRN  --  NEG   HEMOGLOBIN 12.2  --    PLATELETCT 213  --        Assessment/Plan:   Kelsey Penrose is a 33 y.o. female  at 38w0d by stated dates who presents as transfer of care for gestational hypertension.    #SIUP at 38w0d by stated dates  - prenatal care with [facility] in [place]  - continue prenatal vitamins  - Labor status: Early latent labor.    #fetal status, Cat I  - reassuring  - continuous fetal monitoring    #labor induction/augmentation  - Due to unfavorable cervix, we have used cervadil x1, s/p cytotec x2 orally, Cook balloon 50/50ml at 1545  - pitocin titrated to adequate contractions, when appropriate  - clear liquid diet  - pt can epidural when she desires  - mIVF as indicated     #gestational hypertension  - no severe range Bps  - P:C 86 on admission    #GBS +  - PCN 5 mil units loading dose, then 2.5mil units q4h    #rubella: non-immune  - MMR vaccine to be given postpartum    #HIV NR, RPR NR, HBsAg NR, Hep C NR, GCCT neg/neg    #blood type O neg/neg  - Rhogam to be given postpartum    #contraception: TBD    #HCM:  - Tdap: not documented in pregnancy, last dose >10yr per [employer]  - Flu: ot documented  - COVID: ot documented  - RSV:  ot documented    Kindra Parkinson MD, MPH  [facility]  [facility]

## 2024-02-13 NOTE — ED PROVIDER NOTES
OB ED EVALUATION NOTE    SUBJECTIVE:  Kelsey Penrose is a 33 y.o.,  at 37w6d who presents for evaluation of elevated blood pressure noted during office visit. She has had irregular contractions. She denies vaginal bleeding or leakage of fluid. She states the baby is moving.     +Mild to moderate H/A x4 days, has not tried OTC meds, gradually getting worse. Uncertain if similar to prior migraines. Denies vision changes, CP, dyspnea, persistent or severe epigastric/RUQ pain, or significant peripheral edema.    No history of elevated BP or HTN.  BP was 130/100 x2 during visit with midwife around 13:30    I personally reviewed the past medical and surgical histories, as well as the problem list.    Patient Active Problem List   Diagnosis    History of bipolar disorder    Vertigo    Intractable migraine with aura without status migrainosus         OBJECTIVE:  Vital Signs:   Temp:  [36.1 °C (97 °F)] 36.1 °C (97 °F)  Pulse:  [] 82  Resp:  [18] 18  BP: (132-153)/(81-98) 132/87  SpO2:  [96 %-98 %] 98 %   GEN: NAD, alert and conversant  CV: RRR, nl S1 and S2  Resp: unlabored, symmetric chest rise, CTAB  Abd: soft, NT, gravid  Ext: no edema      SVE: 1/thick/-4  Presentation: cephalic by BSUS    NST read: 135/minimal to moderate variability/accelerations present/decelerations absent  [place]: no ctx or irritability    LABS / IMAGING:  Results for orders placed or performed during the hospital encounter of 24   GRP B STREP, BY PCR (MCCLENDON BROTH)   Result Value Ref Range    Strep Gp B DNA PCR POSITIVE (A) Negative   AMBIGUOUS DATE/TIME   Result Value Ref Range    Ambiguous Date/Time See Below:      Hg 12.2    CMP:pending  Urine protein:creatinine: 86    ASSESSMENT AND PLAN:  33 y.o.  at 37w6d presenting for further evaluation of elevated blood pressure noted during JUVENTINO visit with her midwife today.     #Elevated blood pressure  No h/o elevated BP earlier in pregnancy or outside of pregnancy  Has had multiple  elevated BP readings since 13:30 today  No severe range pressures  Mild to moderate H/A, otherwise asymptomatic  Urine P:C and CBC reassuring; CMP in process.  - Admit to L&D for IOL for gHTN at >37 weeks gestation      Geri Cheng M.D.

## 2024-02-13 NOTE — H&P
OB H&P:    CC: elevated BP    HPI:  Kelsey Penrose is a 33 y.o.  @ 37w6d by LMP c/w 6 week U/S presenting for elevated blood pressure noted during exam at midwife's office today. She has had irregular ctx but denies LOF or VB. She reports good FM.     Patient reports mild H/A x4 days. Denies vision changes, dyspnea, persistent/severe RUQ/epigastric pain, and significant peripheral edema.   She has not history of HTN or elevated blood pressures prior to today.  Had BP readings of 130/100 x2 in office during JUVENTINO visit this afternoon, first noted around 13:30.    PNC with [facility]    PNL:  RPR NR  1-hour GTT wnl  GBS positive  Remainder of PNL to be scanned in.      ROS:  Const: denies fevers, general concerns  ENT: Denies rhinorrhea, congestion, sore throat  CV: Denies CP or significant peripheral edema  Resp: Denies dyspnea, cough  GI: denies abd pain, GI concerns  : see HPI  Neuro: + HA; denies vision changes    OB History    Para Term  AB Living   1             SAB IAB Ectopic Molar Multiple Live Births                    # Outcome Date GA Lbr [place]/2nd Weight Sex Delivery Anes PTL Lv   1 Current                GYN: denies STIs, no cervical procedures    No past medical history on file.    No past surgical history on file.    No current facility-administered medications on file prior to encounter.     Current Outpatient Medications on File Prior to Encounter   Medication Sig Dispense Refill    Multiple Vitamin (MULTIVITAMIN ADULT PO) Take  by mouth. (Patient not taking: Reported on 2022)      sertraline (ZOLOFT) 50 MG Tab Take 50 mg by mouth every day.         No family history on file.    Social History     Socioeconomic History    Marital status: Single     Spouse name: Not on file    Number of children: Not on file    Years of education: Not on file    Highest education level: Not on file   Occupational History    Not on file   Tobacco Use    Smoking status: Former      Current packs/day: 0.00     Types: Cigarettes     Quit date: 2019     Years since quittin.7    Smokeless tobacco: Current   Vaping Use    Vaping Use: Never used   Substance and Sexual Activity    Alcohol use: Not Currently     Comment: OCC    Drug use: No    Sexual activity: Not on file   Other Topics Concern    Not on file   Social History Narrative    Not on file     Social Determinants of Health     Financial Resource Strain: Not on file   Food Insecurity: Not on file   Transportation Needs: Not on file   Physical Activity: Not on file   Stress: Not on file   Social Connections: Not on file   Intimate Partner Violence: Not on file   Housing Stability: Not on file       PE:  Vitals:    24 1456 24 1511 24 1526 24 1632   BP: 137/86 (!) 145/90 132/87 (!) 156/85   Pulse: 89 90 82 90   Resp:       Temp:       TempSrc:       SpO2: 98%      Weight:       Height:         Gen: alert, conversant, no acute distress  CV: RRR, nl S1 and S2 without murmurs, cap refill <2 sec  Resp: Unlabored respirations, symmetric chest rise, CTAB  abd: soft, gravid, NT, EFW 3400 g  Ext: NT, no edema  Neuro: No gross focal deficits, sensation intact to light touch throughout    Cephalic presentation by BSUS    SVE: 1/thick/-4   FHT: 125/moderate variability/+ accels/ no decels  reji: rare/irregular ctx    A/P: 33 y.o.  @ 37w6d by LMP c/w 6 week U/S with new diagnosis of gestational HTN.    #gHTN  No prior elevated BP  Has had multiple BP >140/90 since 13:30 today. No severe range blood pressures.  Mild H/A, no other symptoms  Not requiring medications at this time  CBC and urine P:C reassuring; CMP pending  - Admit to L&D for IOL given gestational age >37 weeks and new dx of gHTN  - IV access  - Hypertensive protocol ordered    #SIUP  Cephalic presentation  Unfavorable cervical exam  - Cervidil for ripening. Plan for pitocin and/or AROM when appropriate.  - continuous EFM at this time    #GBS positive  NKDA  -  initiate PCN when in labor    #Prenatal care with outside group  Awaiting prenatal lab results to be scanned into chart.     Geri Cheng M.D.

## 2024-02-13 NOTE — CARE PLAN
The patient is Stable - Low risk of patient condition declining or worsening    Shift Goals  Clinical Goals: Cervical effacement and dilation  Patient Goals: Healthy mom, healthy baby  Family Goals: Support    Progress made toward(s) clinical / shift goals:  Progressing     Problem: Knowledge Deficit - L&D  Goal: Patient and family/caregivers will demonstrate understanding of plan of care, disease process/condition, diagnostic tests and medications  Outcome: Progressing  Note: Pt questions and concerns answered. Will continue to update and include pt in POC.     Problem: Risk for Injury  Goal: Patient and fetus will be free of preventable injury/complications  Outcome: Progressing  Note: External fetal and maternal monitoring in place.

## 2024-02-13 NOTE — PROGRESS NOTES
0700- Report from MICHAEL Wilks. Care assumed.     1900- Report to MICHAEL Pearson. Care relinquished.

## 2024-02-13 NOTE — PROGRESS NOTES
PATIENT ID:  NAME:  Kelsey Penrose  MRN:               [id number]  YOB: 1990    Subjective:   Pt reports cramping on and off, she would like to showed before the induction.     Objective:    Vitals:    24 1456 24 1511 24 1526 24 1632   BP: 137/86 (!) 145/90 132/87 (!) 156/85   Pulse: 89 90 82 90   Resp:       Temp:       TempSrc:       SpO2: 98%      Weight:       Height:           Cervix:  1cm/thick%/high, intact membranes  [place]: Uterine Contractions Q  minutes.   FHRM: Baseline 120, moderate variability, Accels present, no decels       Assessment:   33 y.o. female  at 37w6d.      Plan:   LR for IV hydration  Cervidil placed  Pt can shower and eat after an hour in bed  Continuous fetal heart rate and maternal monitoring  Anticipate

## 2024-02-13 NOTE — PROGRESS NOTES
"[facility]  Labor & Delivery Progress Note    Kelsey Penrose is a 33 y.o. female  at 38w0d by stated dates who presents as transfer of care for gestational hypertension.    Subjective:  Pt Is doing well. She has discomfort with the vaginal exams. She is considering epidural. Cervadil was removed and pt SVE 1.5/50/-3, intact. She would like to try cytotec before balloon placement with pitocin.     Uterine contractions: irregular, not painful  Leakage of fluid: denies  Vaginal bleeding: denies  Fetal movement: affirms      Objective:   BP (!) 144/80   Pulse 100   Temp 35.9 °C (96.7 °F) (Temporal)   Resp 20   Ht 1.753 m (5' 9\")   Wt (!) 134 kg (295 lb)   SpO2 97%     GEN: NAD, alert, calm, cooperative, laying comfortably in bed  HEENT: NCAT, EOMI  CV: warm and well perfused, trace BLE edema, radial pulses 2+  RESP: no cough, breathing comfortably on RA  ABS: soft, NTTP, gravid  : no lesions  MSK: moving all extremities    FHT: Cat I, baseline , variability moderate, +accels, -decels, +UC irregular    SVE: 1.5/50/-3    AROM: no  IUPC: no  FSE no  Epidural: no    Meds:     Current Facility-Administered Medications:     LR infusion, , Intravenous, Continuous, Geri Cheng M.D., Last Rate: 125 mL/hr at 24 0700, Restarted at 24 0700    lidocaine (XYLOCAINE) 1%  injection, 20 mL, Subcutaneous, Once PRN, Geri Cheng M.D.    terbutaline (Brethine) injection 0.25 mg, 0.25 mg, Subcutaneous, Once PRN, Geri Cheng M.D.    oxytocin (Pitocin) infusion bolus (for post delivery), 20 Units, Intravenous, Once, Held at 24 1845 **FOLLOWED BY** oxytocin (Pitocin) infusion (for post delivery), 125 mL/hr, Intravenous, Continuous, Geri Cheng M.D., Held at 24 193    oxytocin (Pitocin) injection 10 Units, 10 Units, Intramuscular, Once PRN, Geri Cheng M.D.    ibuprofen (Motrin) tablet 800 mg, 800 mg, Oral, Once PRN, Geri Cheng M.D.    fentaNYL (Sublimaze) " injection 50 mcg, 50 mcg, Intravenous, Q HOUR PRN **OR** fentaNYL (Sublimaze) injection 100 mcg, 100 mcg, Intravenous, Q HOUR PRN, Geri Cheng M.D.    penicillin G potassium 5 Million Units in  mL IVPB, 5 Million Units, Intravenous, Once, Held at 02/12/24 1845 **FOLLOWED BY** penicillin G potassium 2.5 million units in  mL IVPB, 2.5 Million Units, Intravenous, Q4HR, Geri Cheng M.D., Held at 02/12/24 2300    NIFEdipine IR (Procardia) capsule 10 mg, 10 mg, Oral, Q20MIN PRN **OR** labetalol (Normodyne/Trandate) injection 20-80 mg, 20-80 mg, Intravenous, Q10 MIN PRN **OR** hydrALAZINE (Apresoline) injection 5-10 mg, 5-10 mg, Intravenous, Q20MIN PRN, Geri Cheng M.D.    ondansetron (Zofran ODT) dispertab 4 mg, 4 mg, Oral, Q6HRS PRN **OR** ondansetron (Zofran) syringe/vial injection 4 mg, 4 mg, Intravenous, Q6HRS PRN, Geri Cheng M.D.    mag hydrox-al hydrox-simeth (Maalox Plus Es Or Mylanta Ds) suspension 30 mL, 30 mL, Oral, Q6HRS PRN, Geri Cheng M.D.    hydrOXYzine HCl (Atarax) tablet 50 mg, 50 mg, Oral, Q6HRS PRN, Geri Cheng M.D.    miSOPROStol (Cytotec) tablet 800 mcg, 800 mcg, Rectal, Once PRN, Geri Cheng M.D.    carboPROST (Hemabate) injection 250 mcg, 250 mcg, Intramuscular, Once PRN, Geri Cheng M.D.    sertraline (Zoloft) tablet 75 mg, 75 mg, Oral, Nightly, Alejandrina Uribe, A.P.R.N., 75 mg at 02/12/24 2242    diphenhydrAMINE (Benadryl) 12.5 MG/5ML liquid 12.5 mg, 12.5 mg, Oral, Nightly, Alejandrina Uribe, A.P.R.N., 12.5 mg at 02/12/24 4328    omeprazole (PriLOSEC) capsule 20 mg, 20 mg, Oral, DAILY, Alejandrina Uribe, A.P.R.N., 20 mg at 02/12/24 2306     Labs:  Recent Results (from the past 24 hour(s))   POCT FETAL NONSTRESS TEST    Collection Time    8/1/13  1:15 PM       Component Value Range    NST Indications SUA, SGA      NST Baseline 130      NST Uterine Activity none      NST Acoustic Stimulation vas      NST Assessment reactive      NST Action  Necessary        NST Other Data        NST Return        NST Read By         Recent Labs     24  1701 24  1800   ABOGROUP  --  O   ABSCRN  --  NEG   HEMOGLOBIN 12.2  --    PLATELETCT 213  --        Assessment/Plan:   Kelsey Penrose is a 33 y.o. female  at 38w0d by stated dates who presents as transfer of care for gestational hypertension.    #SIUP at 38w0d by stated dates  - prenatal care with [facility] midwife group in [place]  - continue prenatal vitamins  - Labor status: Early latent labor.    #fetal status, Cat I  - reassuring  - continuous fetal monitoring    #labor induction/augmentation  - Due to unfavorable cervix, we have used cervadil x1, will try cytotec x1 orally  - pitocin titrated to adequate contractions, when appropriate  - clear liquid diet  - pt can epidural when she desires  - mIVF as indicated     #gestational hypertension  - no severe range Bps  - P:C 86 on admission    #GBS +  - PCN 5 mil units loading dose, then 2.5mil units q4h    #rubella: non-immune  - MMR vaccine to be given postpartum    #HIV NR, RPR NR, HBsAg NR, Hep C NR, GCCT neg/neg    #blood type O neg/neg  - Rhogam to be given postpartum    #contraception: TBD    #HCM:  - Tdap: not documented in pregnancy, last dose >10yr per WebIZ  - Flu: ot documented  - COVID: ot documented  - RSV:  ot documented    Kindra Parkinson MD, MPH  [facility]  [facility]

## 2024-02-14 ENCOUNTER — ANESTHESIA (OUTPATIENT)
Dept: ANESTHESIOLOGY | Facility: MEDICAL CENTER | Age: 34
End: 2024-02-14
Payer: COMMERCIAL

## 2024-02-14 ENCOUNTER — ANESTHESIA EVENT (OUTPATIENT)
Dept: ANESTHESIOLOGY | Facility: MEDICAL CENTER | Age: 34
End: 2024-02-14
Payer: COMMERCIAL

## 2024-02-14 LAB — NUMBER OF RH DOSES IND 8505RD: NORMAL

## 2024-02-14 PROCEDURE — 304965 HCHG RECOVERY SERVICES

## 2024-02-14 PROCEDURE — 700102 HCHG RX REV CODE 250 W/ 637 OVERRIDE(OP): Performed by: OBSTETRICS & GYNECOLOGY

## 2024-02-14 PROCEDURE — 700102 HCHG RX REV CODE 250 W/ 637 OVERRIDE(OP): Performed by: STUDENT IN AN ORGANIZED HEALTH CARE EDUCATION/TRAINING PROGRAM

## 2024-02-14 PROCEDURE — 770002 HCHG ROOM/CARE - OB PRIVATE (112)

## 2024-02-14 PROCEDURE — 36415 COLL VENOUS BLD VENIPUNCTURE: CPT

## 2024-02-14 PROCEDURE — 303615 HCHG EPIDURAL/SPINAL ANESTHESIA FOR LABOR

## 2024-02-14 PROCEDURE — 700102 HCHG RX REV CODE 250 W/ 637 OVERRIDE(OP): Performed by: FAMILY MEDICINE

## 2024-02-14 PROCEDURE — A9270 NON-COVERED ITEM OR SERVICE: HCPCS | Performed by: OBSTETRICS & GYNECOLOGY

## 2024-02-14 PROCEDURE — 700102 HCHG RX REV CODE 250 W/ 637 OVERRIDE(OP): Performed by: NURSE PRACTITIONER

## 2024-02-14 PROCEDURE — 700101 HCHG RX REV CODE 250: Performed by: STUDENT IN AN ORGANIZED HEALTH CARE EDUCATION/TRAINING PROGRAM

## 2024-02-14 PROCEDURE — A9270 NON-COVERED ITEM OR SERVICE: HCPCS | Performed by: STUDENT IN AN ORGANIZED HEALTH CARE EDUCATION/TRAINING PROGRAM

## 2024-02-14 PROCEDURE — 59409 OBSTETRICAL CARE: CPT

## 2024-02-14 PROCEDURE — 700111 HCHG RX REV CODE 636 W/ 250 OVERRIDE (IP): Performed by: STUDENT IN AN ORGANIZED HEALTH CARE EDUCATION/TRAINING PROGRAM

## 2024-02-14 PROCEDURE — 700111 HCHG RX REV CODE 636 W/ 250 OVERRIDE (IP): Performed by: ANESTHESIOLOGY

## 2024-02-14 PROCEDURE — 700101 HCHG RX REV CODE 250: Performed by: ANESTHESIOLOGY

## 2024-02-14 PROCEDURE — A9270 NON-COVERED ITEM OR SERVICE: HCPCS | Performed by: NURSE PRACTITIONER

## 2024-02-14 PROCEDURE — 700105 HCHG RX REV CODE 258: Performed by: STUDENT IN AN ORGANIZED HEALTH CARE EDUCATION/TRAINING PROGRAM

## 2024-02-14 PROCEDURE — 700111 HCHG RX REV CODE 636 W/ 250 OVERRIDE (IP): Mod: JZ

## 2024-02-14 PROCEDURE — 59409 OBSTETRICAL CARE: CPT | Performed by: OBSTETRICS & GYNECOLOGY

## 2024-02-14 PROCEDURE — 700105 HCHG RX REV CODE 258: Performed by: ANESTHESIOLOGY

## 2024-02-14 PROCEDURE — 700111 HCHG RX REV CODE 636 W/ 250 OVERRIDE (IP): Mod: JZ | Performed by: ANESTHESIOLOGY

## 2024-02-14 PROCEDURE — A9270 NON-COVERED ITEM OR SERVICE: HCPCS | Performed by: FAMILY MEDICINE

## 2024-02-14 PROCEDURE — 0KQM0ZZ REPAIR PERINEUM MUSCLE, OPEN APPROACH: ICD-10-PCS | Performed by: OBSTETRICS & GYNECOLOGY

## 2024-02-14 RX ORDER — HYDROXYZINE HYDROCHLORIDE 25 MG/1
50 TABLET, FILM COATED ORAL EVERY 6 HOURS PRN
Status: DISCONTINUED | OUTPATIENT
Start: 2024-02-14 | End: 2024-02-16 | Stop reason: HOSPADM

## 2024-02-14 RX ORDER — SODIUM CHLORIDE, SODIUM LACTATE, POTASSIUM CHLORIDE, AND CALCIUM CHLORIDE .6; .31; .03; .02 G/100ML; G/100ML; G/100ML; G/100ML
250 INJECTION, SOLUTION INTRAVENOUS PRN
Status: DISCONTINUED | OUTPATIENT
Start: 2024-02-14 | End: 2024-02-14 | Stop reason: HOSPADM

## 2024-02-14 RX ORDER — BUPIVACAINE HYDROCHLORIDE 2.5 MG/ML
INJECTION, SOLUTION EPIDURAL; INFILTRATION; INTRACAUDAL
Status: COMPLETED | OUTPATIENT
Start: 2024-02-14 | End: 2024-02-14

## 2024-02-14 RX ORDER — DOCUSATE SODIUM 100 MG/1
100 CAPSULE, LIQUID FILLED ORAL 2 TIMES DAILY PRN
Status: DISCONTINUED | OUTPATIENT
Start: 2024-02-14 | End: 2024-02-16 | Stop reason: HOSPADM

## 2024-02-14 RX ORDER — IBUPROFEN 800 MG/1
800 TABLET ORAL EVERY 8 HOURS PRN
Status: DISCONTINUED | OUTPATIENT
Start: 2024-02-15 | End: 2024-02-16 | Stop reason: HOSPADM

## 2024-02-14 RX ORDER — ACETAMINOPHEN 500 MG
1000 TABLET ORAL EVERY 6 HOURS PRN
Status: DISCONTINUED | OUTPATIENT
Start: 2024-02-14 | End: 2024-02-16 | Stop reason: HOSPADM

## 2024-02-14 RX ORDER — SODIUM CHLORIDE, SODIUM LACTATE, POTASSIUM CHLORIDE, AND CALCIUM CHLORIDE .6; .31; .03; .02 G/100ML; G/100ML; G/100ML; G/100ML
1000 INJECTION, SOLUTION INTRAVENOUS
Status: COMPLETED | OUTPATIENT
Start: 2024-02-14 | End: 2024-02-14

## 2024-02-14 RX ORDER — LIDOCAINE HYDROCHLORIDE AND EPINEPHRINE 15; 5 MG/ML; UG/ML
INJECTION, SOLUTION EPIDURAL
Status: COMPLETED | OUTPATIENT
Start: 2024-02-14 | End: 2024-02-14

## 2024-02-14 RX ORDER — SODIUM CHLORIDE, SODIUM LACTATE, POTASSIUM CHLORIDE, CALCIUM CHLORIDE 600; 310; 30; 20 MG/100ML; MG/100ML; MG/100ML; MG/100ML
INJECTION, SOLUTION INTRAVENOUS PRN
Status: DISCONTINUED | OUTPATIENT
Start: 2024-02-14 | End: 2024-02-16 | Stop reason: HOSPADM

## 2024-02-14 RX ORDER — ROPIVACAINE HYDROCHLORIDE 2 MG/ML
INJECTION, SOLUTION EPIDURAL; INFILTRATION; PERINEURAL
Status: COMPLETED
Start: 2024-02-14 | End: 2024-02-14

## 2024-02-14 RX ORDER — EPHEDRINE SULFATE 50 MG/ML
5 INJECTION, SOLUTION INTRAVENOUS
Status: DISCONTINUED | OUTPATIENT
Start: 2024-02-14 | End: 2024-02-14 | Stop reason: HOSPADM

## 2024-02-14 RX ORDER — ROPIVACAINE HYDROCHLORIDE 2 MG/ML
INJECTION, SOLUTION EPIDURAL; INFILTRATION; PERINEURAL CONTINUOUS
Status: DISCONTINUED | OUTPATIENT
Start: 2024-02-14 | End: 2024-02-15

## 2024-02-14 RX ADMIN — SERTRALINE HYDROCHLORIDE 75 MG: 50 TABLET ORAL at 22:23

## 2024-02-14 RX ADMIN — IBUPROFEN 800 MG: 800 TABLET, FILM COATED ORAL at 17:49

## 2024-02-14 RX ADMIN — SODIUM CHLORIDE, POTASSIUM CHLORIDE, SODIUM LACTATE AND CALCIUM CHLORIDE 1000 ML: 600; 310; 30; 20 INJECTION, SOLUTION INTRAVENOUS at 02:20

## 2024-02-14 RX ADMIN — ONDANSETRON 4 MG: 2 INJECTION INTRAMUSCULAR; INTRAVENOUS at 09:39

## 2024-02-14 RX ADMIN — HYDROXYZINE HYDROCHLORIDE 50 MG: 25 TABLET, FILM COATED ORAL at 22:51

## 2024-02-14 RX ADMIN — ACETAMINOPHEN 1000 MG: 500 TABLET ORAL at 22:23

## 2024-02-14 RX ADMIN — ROPIVACAINE HYDROCHLORIDE: 2 INJECTION, SOLUTION EPIDURAL; INFILTRATION at 14:12

## 2024-02-14 RX ADMIN — ROPIVACAINE HYDROCHLORIDE 200 MG: 2 INJECTION, SOLUTION EPIDURAL; INFILTRATION at 03:42

## 2024-02-14 RX ADMIN — OXYTOCIN 125 ML/HR: 10 INJECTION, SOLUTION INTRAMUSCULAR; INTRAVENOUS at 20:55

## 2024-02-14 RX ADMIN — ACETAMINOPHEN 1000 MG: 500 TABLET ORAL at 09:38

## 2024-02-14 RX ADMIN — OMEPRAZOLE 20 MG: 20 CAPSULE, DELAYED RELEASE ORAL at 17:49

## 2024-02-14 RX ADMIN — BUPIVACAINE HYDROCHLORIDE 7 ML: 2.5 INJECTION, SOLUTION EPIDURAL; INFILTRATION; INTRACAUDAL at 02:53

## 2024-02-14 RX ADMIN — SODIUM CHLORIDE 2.5 MILLION UNITS: 9 INJECTION, SOLUTION INTRAVENOUS at 03:37

## 2024-02-14 RX ADMIN — LIDOCAINE HYDROCHLORIDE,EPINEPHRINE BITARTRATE 5 ML: 15; .005 INJECTION, SOLUTION EPIDURAL; INFILTRATION; INTRACAUDAL; PERINEURAL at 02:53

## 2024-02-14 RX ADMIN — SODIUM CHLORIDE 2.5 MILLION UNITS: 9 INJECTION, SOLUTION INTRAVENOUS at 15:08

## 2024-02-14 RX ADMIN — SODIUM CHLORIDE 2.5 MILLION UNITS: 9 INJECTION, SOLUTION INTRAVENOUS at 07:13

## 2024-02-14 RX ADMIN — OXYTOCIN 125 ML/HR: 10 INJECTION, SOLUTION INTRAMUSCULAR; INTRAVENOUS at 17:53

## 2024-02-14 ASSESSMENT — EDINBURGH POSTNATAL DEPRESSION SCALE (EPDS)
I HAVE BLAMED MYSELF UNNECESSARILY WHEN THINGS WENT WRONG: NOT VERY OFTEN
THE THOUGHT OF HARMING MYSELF HAS OCCURRED TO ME: NEVER
I HAVE FELT SCARED OR PANICKY FOR NO GOOD REASON: NO, NOT AT ALL
THINGS HAVE BEEN GETTING ON TOP OF ME: NO, MOST OF THE TIME I HAVE COPED QUITE WELL
I HAVE FELT SAD OR MISERABLE: NOT VERY OFTEN
I HAVE BEEN SO UNHAPPY THAT I HAVE HAD DIFFICULTY SLEEPING: NOT AT ALL
I HAVE BEEN SO UNHAPPY THAT I HAVE BEEN CRYING: NO, NEVER
I HAVE BEEN ABLE TO LAUGH AND SEE THE FUNNY SIDE OF THINGS: AS MUCH AS I ALWAYS COULD
I HAVE LOOKED FORWARD WITH ENJOYMENT TO THINGS: AS MUCH AS I EVER DID
I HAVE BEEN ANXIOUS OR WORRIED FOR NO GOOD REASON: HARDLY EVER

## 2024-02-14 ASSESSMENT — PAIN DESCRIPTION - PAIN TYPE
TYPE: ACUTE PAIN

## 2024-02-14 ASSESSMENT — PAIN SCALES - GENERAL: PAIN_LEVEL: 1

## 2024-02-14 NOTE — PROGRESS NOTES
1900- Report received from Yenifer RODRIGUEZ. Care assumed. FHT difficult to trace, provider aware,  plan to internalize monitoring with pt after rupture    1930- Pt ambulating to restroom, balloon came out without traction, small gush of clear fluid along with it    1937-Kleber to bedside to assess in person. SVE- Provider states she still feel some intact membranes.  POC discussed. Pt agrees to pitocin for augmentation.    0037- Kleber to bedside, unable to internalize pt.    0100- Dr. Barone to bedside, POC discussed with pt, pt agreeable.     0200- Pt requesting epidural.    0300- Hovermat placed under pt.     0415- Pt requesting epidural be turned down to 7 ml/hr continuous.     0440- Dr. Barone to bedside, SVE: 3-4/70/-3. IUPC and FSE placed.\      0700- Report given to Gladys RODRIGUEZ care transferred.

## 2024-02-14 NOTE — PROGRESS NOTES
S: In to introduce self to pt and . Cook catheter out spontneously at 1930.   Questionable SROM.    O:    Vitals:    02/12/24 2241 02/13/24 0800 02/13/24 1240 02/13/24 1550   BP: 121/75 (!) 144/80 (!) 145/91 (!) 133/90   Pulse: (!) 102 100 96 86   Resp:  20     Temp: 35.9 °C (96.7 °F) 35.9 °C (96.7 °F) 36 °C (96.8 °F) 36.3 °C (97.3 °F)   TempSrc: Temporal Temporal Temporal Temporal   SpO2:  97%     Weight:       Height:               FHTs:  Baseline 130, + accels, - decels, moderate variability        [place]: Contractions irreg, not tracking well, mod to palpation        SVE: 3/70/-3     A/P:  1.  IUP @ 38w0d            2.  Cat 1 FHTs             3.  S/P cook catheter             4.  Begin pitocin           5.  PCN for positive GBS    Reva Chavis, NAVEEN, APRN

## 2024-02-14 NOTE — ANESTHESIA PROCEDURE NOTES
Epidural Block    Date/Time: 2/14/2024 2:53 AM    Performed by: Lenard Rivas M.D.  Authorized by: Lenard Rivas M.D.    Patient Location:  OB  Start Time:  2/14/2024 2:53 AM  Reason for Block: labor analgesia    patient identified, IV checked, site marked, risks and benefits discussed, surgical consent, monitors and equipment checked and pre-op evaluation    Patient Position:  Sitting  Prep: ChloraPrep, patient draped and sterile technique    Monitoring:  Blood pressure, continuous pulse oximetry and heart rate  Approach:  Midline  Location:  L3-L4  Injection Technique:  URBAN saline  Skin infiltration:  Lidocaine  Strength:  1%  Dose:  3ml  Needle Type:  Tuohy  Needle Gauge:  17 G  Needle Length:  3.5 in  Loss of resistance::  9  Catheter Size:  19 G  Catheter at Skin Depth:  16  Test Dose Result:  Negative

## 2024-02-14 NOTE — PROGRESS NOTES
"[facility]  Labor & Delivery Progress Note    Kelsey Penrose is a 33 y.o. female  at 38w0d by stated dates who presents as transfer of care for gestational hypertension.    Subjective:  Pt Is doing well. She has discomfort with the vaginal exams. Patient agreed to epidural placement, then IUPC/FSE and pitocin has been started. Pt is anxious about lack of sensation in her legs after epidural placement.    Uterine contractions: irregular, not painful  Leakage of fluid: denies  Vaginal bleeding: affirms  Fetal movement: affirms    Objective:   /74   Pulse 99   Temp 36.1 °C (97 °F) (Temporal)   Resp 19   Ht 1.753 m (5' 9\")   Wt (!) 134 kg (295 lb)   SpO2 97%     GEN: NAD, alert, calm, cooperative, laying comfortably in bed  HEENT: NCAT, EOMI  CV: warm and well perfused, trace BLE edema, radial pulses 2+  RESP: no cough, breathing comfortably on RA  ABS: soft, NTTP, gravid  : no lesions  MSK: moving all extremities    FHT: Cat I, baseline , variability moderate, +accels, -decels, +UC irregular    SVE: 3-4/70/-3    SROM 1930  AROM attempted due to membranes palpable over scalp for FSE placement, scant blood tinged fluid  IUPC: yes  FSE yes  Epidural: yes    Meds:     Current Facility-Administered Medications:     LR (Bolus) infusion 1,000 mL, 1,000 mL, Intravenous, Once PRN, Lenard Rivas M.D.    ropivacaine 0.2 % (Naropin) injection, , Epidural, Continuous, Lenard Rivas M.D.    ePHEDrine injection 5 mg, 5 mg, Intravenous, Q5 MIN PRN **AND** Notify Anesthesiologist if ephedrine given and for sustained hypotension (more than 2 minutes), , , Once **AND** For Hypotension: Place patient in left lateral tilt to achieve uterine displacement and elevate legs., , , PRN **AND** Hypotension: Oxygen Continuous, , , CONTINUOUS **AND** LR (Bolus) infusion 250 mL, 250 mL, Intravenous, PRN, Lenard Rivas M.D.    acetaminophen (Tylenol) tablet 1,000 mg, 1,000 mg, Oral, Q6HRS PRN, [place] " HOLLY Parkinson M.D., 1,000 mg at 24    oxytocin (Pitocin) 0.02 Units/mL LR (induction of labor), 0.5-20 sabine-units/min, Intravenous, Continuous, Kindra Parkinson M.D., Last Rate: 12 mL/hr at 24 0356, 4 sabine-units/min at 24 0356    LR infusion, , Intravenous, Continuous, Geri Cheng M.D., Last Rate: 125 mL/hr at 24 0700, Restarted at 24 0700    lidocaine (XYLOCAINE) 1%  injection, 20 mL, Subcutaneous, Once PRN, Geri Cheng M.D.    terbutaline (Brethine) injection 0.25 mg, 0.25 mg, Subcutaneous, Once PRN, Geri Cheng M.D.    [] oxytocin (Pitocin) infusion bolus (for post delivery), 20 Units, Intravenous, Once, Held at 24 **FOLLOWED BY** oxytocin (Pitocin) infusion (for post delivery), 125 mL/hr, Intravenous, Continuous, Geri Cheng M.D., Held at 24 193    oxytocin (Pitocin) injection 10 Units, 10 Units, Intramuscular, Once PRN, Geri Cheng M.D.    ibuprofen (Motrin) tablet 800 mg, 800 mg, Oral, Once PRN, Geri Cheng M.D.    fentaNYL (Sublimaze) injection 50 mcg, 50 mcg, Intravenous, Q HOUR PRN **OR** fentaNYL (Sublimaze) injection 100 mcg, 100 mcg, Intravenous, Q HOUR PRN, Geri Cheng M.D., 100 mcg at 24 1527    [] penicillin G potassium 5 Million Units in  mL IVPB, 5 Million Units, Intravenous, Once, Held at 24 **FOLLOWED BY** penicillin G potassium 2.5 million units in  mL IVPB, 2.5 Million Units, Intravenous, Q4HR, Geri Cheng M.D., Last Rate: 200 mL/hr at 24 0356, Rate Verify at 24 0356    NIFEdipine IR (Procardia) capsule 10 mg, 10 mg, Oral, Q20MIN PRN **OR** labetalol (Normodyne/Trandate) injection 20-80 mg, 20-80 mg, Intravenous, Q10 MIN PRN **OR** hydrALAZINE (Apresoline) injection 5-10 mg, 5-10 mg, Intravenous, Q20MIN PRN, Geri Cheng M.D.    ondansetron (Zofran ODT) dispertab 4 mg, 4 mg, Oral, Q6HRS PRN, 4 mg at 24 1611 **OR** ondansetron (Zofran)  syringe/vial injection 4 mg, 4 mg, Intravenous, Q6HRS PRN, Geri Cheng M.D.    mag hydrox-al hydrox-simeth (Maalox Plus Es Or Mylanta Ds) suspension 30 mL, 30 mL, Oral, Q6HRS PRN, Geri Cheng M.D.    hydrOXYzine HCl (Atarax) tablet 50 mg, 50 mg, Oral, Q6HRS PRN, Geri Cheng M.D., 50 mg at 24    miSOPROStol (Cytotec) tablet 800 mcg, 800 mcg, Rectal, Once PRN, Geri Cheng M.D.    carboPROST (Hemabate) injection 250 mcg, 250 mcg, Intramuscular, Once PRN, Geri Cheng M.D.    sertraline (Zoloft) tablet 75 mg, 75 mg, Oral, Nightly, Alejandrina Uribe, A.P.R.N., 75 mg at 24    diphenhydrAMINE (Benadryl) 12.5 MG/5ML liquid 12.5 mg, 12.5 mg, Oral, Nightly, Alejandrina Hornerson, A.P.R.N., 12.5 mg at 24 2338    omeprazole (PriLOSEC) capsule 20 mg, 20 mg, Oral, DAILY, Alejandrina Hornerson, A.P.R.N., 20 mg at 24     Labs:  Recent Results (from the past 24 hour(s))   POCT FETAL NONSTRESS TEST    Collection Time    13  1:15 PM       Component Value Range    NST Indications SUA, SGA      NST Baseline 130      NST Uterine Activity none      NST Acoustic Stimulation vas      NST Assessment reactive      NST Action Necessary        NST Other Data        NST Return        NST Read By         Recent Labs     24  1701 24  1800   ABOGROUP  --  O   ABSCRN  --  NEG   HEMOGLOBIN 12.2  --    PLATELETCT 213  --        Assessment/Plan:   Kelsey Penrose is a 33 y.o. female  at 38w0d by stated dates who presents as transfer of care for gestational hypertension.    #SIUP at 38w0d by stated dates  - prenatal care with [facility]wife group in [place]  - continue prenatal vitamins  - Labor status: latent labor.    #fetal status, Cat I  - reassuring  - continuous fetal monitoring  - FSE and IUPC placed at 0445    #labor induction/augmentation  - Due to unfavorable cervix, we have used cervadil x1, cytotec, and cook balloon  - pitocin titrated to adequate  contractions  - clear liquid diet  - epidural placed  - mIVF as indicated     #gestational hypertension  - no severe range Bps  - P:C 86 on admission    #GBS +  - PCN 5 mil units loading dose, then 2.5mil units q4h    #rubella: non-immune  - MMR vaccine to be given postpartum    #HIV NR, RPR NR, HBsAg NR, Hep C NR, GCCT neg/neg    #blood type O neg/neg  - Rhogam to be given postpartum    #contraception: TBD    #HCM:  - Tdap: not documented in pregnancy, last dose >10yr per WebIZ  - Flu: ot documented  - COVID: ot documented  - RSV:  ot documented    Kindra Parkinson MD, MPH  [facility]  [facility]

## 2024-02-14 NOTE — ANESTHESIA PREPROCEDURE EVALUATION
Date: 02/14/24  Procedure: Labor Epidural         Relevant Problems   NEURO   (positive) Intractable migraine with aura without status migrainosus      CARDIAC   (positive) Intractable migraine with aura without status migrainosus       Physical Exam    Airway   Mallampati: II  TM distance: >3 FB  Neck ROM: full       Cardiovascular - normal exam  Rhythm: regular  Rate: normal  (-) murmur     Dental - normal exam           Pulmonary - normal exam  Breath sounds clear to auscultation     Abdominal    Neurological - normal exam                   Anesthesia Plan    ASA 2       Plan - epidural   Neuraxial block will be labor analgesia                  Pertinent diagnostic labs and testing reviewed    Informed Consent:    Anesthetic plan and risks discussed with patient.

## 2024-02-15 ENCOUNTER — PHARMACY VISIT (OUTPATIENT)
Dept: PHARMACY | Facility: MEDICAL CENTER | Age: 34
End: 2024-02-15
Payer: COMMERCIAL

## 2024-02-15 LAB
EKG IMPRESSION: NORMAL
ERYTHROCYTE [DISTWIDTH] IN BLOOD BY AUTOMATED COUNT: 48.3 FL (ref 35.9–50)
HCT VFR BLD AUTO: 32 % (ref 37–47)
HGB BLD-MCNC: 10.6 G/DL (ref 12–16)
MCH RBC QN AUTO: 28.6 PG (ref 27–33)
MCHC RBC AUTO-ENTMCNC: 33.1 G/DL (ref 32.2–35.5)
MCV RBC AUTO: 86.3 FL (ref 81.4–97.8)
PLATELET # BLD AUTO: 145 K/UL (ref 164–446)
PMV BLD AUTO: 10.9 FL (ref 9–12.9)
RBC # BLD AUTO: 3.71 M/UL (ref 4.2–5.4)
TROPONIN T SERPL-MCNC: <6 NG/L (ref 6–19)
WBC # BLD AUTO: 7.8 K/UL (ref 4.8–10.8)

## 2024-02-15 PROCEDURE — 84484 ASSAY OF TROPONIN QUANT: CPT

## 2024-02-15 PROCEDURE — 700102 HCHG RX REV CODE 250 W/ 637 OVERRIDE(OP)

## 2024-02-15 PROCEDURE — 93010 ELECTROCARDIOGRAM REPORT: CPT | Performed by: INTERNAL MEDICINE

## 2024-02-15 PROCEDURE — 36415 COLL VENOUS BLD VENIPUNCTURE: CPT

## 2024-02-15 PROCEDURE — 93005 ELECTROCARDIOGRAM TRACING: CPT

## 2024-02-15 PROCEDURE — 85027 COMPLETE CBC AUTOMATED: CPT

## 2024-02-15 PROCEDURE — 700102 HCHG RX REV CODE 250 W/ 637 OVERRIDE(OP): Performed by: OBSTETRICS & GYNECOLOGY

## 2024-02-15 PROCEDURE — 700102 HCHG RX REV CODE 250 W/ 637 OVERRIDE(OP): Performed by: NURSE PRACTITIONER

## 2024-02-15 PROCEDURE — A9270 NON-COVERED ITEM OR SERVICE: HCPCS | Performed by: OBSTETRICS & GYNECOLOGY

## 2024-02-15 PROCEDURE — RXMED WILLOW AMBULATORY MEDICATION CHARGE: Performed by: NURSE PRACTITIONER

## 2024-02-15 PROCEDURE — A9270 NON-COVERED ITEM OR SERVICE: HCPCS | Performed by: NURSE PRACTITIONER

## 2024-02-15 PROCEDURE — 770002 HCHG ROOM/CARE - OB PRIVATE (112)

## 2024-02-15 PROCEDURE — A9270 NON-COVERED ITEM OR SERVICE: HCPCS

## 2024-02-15 RX ORDER — ACETAMINOPHEN 500 MG
1000 TABLET ORAL EVERY 6 HOURS PRN
Qty: 60 TABLET | Refills: 2 | Status: SHIPPED | OUTPATIENT
Start: 2024-02-15

## 2024-02-15 RX ORDER — CALCIUM CARBONATE 500 MG/1
500 TABLET, CHEWABLE ORAL PRN
Status: DISCONTINUED | OUTPATIENT
Start: 2024-02-15 | End: 2024-02-16 | Stop reason: HOSPADM

## 2024-02-15 RX ORDER — CALCIUM CARBONATE 500 MG/1
500 TABLET, CHEWABLE ORAL DAILY
Status: DISCONTINUED | OUTPATIENT
Start: 2024-02-15 | End: 2024-02-15

## 2024-02-15 RX ORDER — PSEUDOEPHEDRINE HCL 30 MG
100 TABLET ORAL 2 TIMES DAILY PRN
Qty: 60 CAPSULE | Refills: 2 | Status: SHIPPED | OUTPATIENT
Start: 2024-02-15

## 2024-02-15 RX ORDER — IBUPROFEN 800 MG/1
800 TABLET ORAL EVERY 8 HOURS PRN
Qty: 60 TABLET | Refills: 2 | Status: SHIPPED | OUTPATIENT
Start: 2024-02-15

## 2024-02-15 RX ADMIN — DOCUSATE SODIUM 100 MG: 100 CAPSULE, LIQUID FILLED ORAL at 21:50

## 2024-02-15 RX ADMIN — HYDROXYZINE HYDROCHLORIDE 50 MG: 25 TABLET, FILM COATED ORAL at 21:56

## 2024-02-15 RX ADMIN — OMEPRAZOLE 20 MG: 20 CAPSULE, DELAYED RELEASE ORAL at 18:49

## 2024-02-15 RX ADMIN — ACETAMINOPHEN 1000 MG: 500 TABLET ORAL at 16:17

## 2024-02-15 RX ADMIN — IBUPROFEN 800 MG: 800 TABLET, FILM COATED ORAL at 10:18

## 2024-02-15 RX ADMIN — ANTACID TABLETS 500 MG: 500 TABLET, CHEWABLE ORAL at 16:17

## 2024-02-15 RX ADMIN — SERTRALINE HYDROCHLORIDE 75 MG: 50 TABLET ORAL at 21:50

## 2024-02-15 RX ADMIN — IBUPROFEN 800 MG: 800 TABLET, FILM COATED ORAL at 21:50

## 2024-02-15 ASSESSMENT — PAIN DESCRIPTION - PAIN TYPE
TYPE: ACUTE PAIN

## 2024-02-15 NOTE — ANESTHESIA TIME REPORT
Anesthesia Start and Stop Event Times       Date Time Event    2/14/2024 0240 Ready for Procedure     0242 Anesthesia Start     1610 Anesthesia Stop          Responsible Staff  02/14/24      Name Role Begin End    Lenard Rivas M.D. Anesth 0242 0651    Arnulfo García M.D. Anesth 0651 1610          Overtime Reason:  no overtime (within assigned shift)    Comments:
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
## 2024-02-15 NOTE — ANESTHESIA POSTPROCEDURE EVALUATION
Patient: Kelsey Penrose    Procedure Summary       Date: 02/14/24 Room / Location:     Anesthesia Start: 0242 Anesthesia Stop: 1610    Procedure: Labor Epidural Diagnosis:     Scheduled Providers:  Responsible Provider: Arnulfo García M.D.    Anesthesia Type: epidural ASA Status: 2            Final Anesthesia Type: epidural  Last vitals  BP   Blood Pressure: 128/89    Temp   36.1 °C (96.9 °F)    Pulse   96   Resp   14    SpO2   97 %      Anesthesia Post Evaluation    Patient location during evaluation: floor  Patient participation: complete - patient participated  Level of consciousness: awake  Pain score: 1    Airway patency: patent  Anesthetic complications: no  Cardiovascular status: hemodynamically stable  Respiratory status: acceptable  Hydration status: acceptable    PONV: none          No notable events documented.     Nurse Pain Score: 0 (NPRS)

## 2024-02-15 NOTE — DISCHARGE SUMMARY
Discharge Summary:      Kelsey Penrose    Admit Date:   2024  Discharge Date:  2/15/2024     Admitting diagnosis:  Labor and delivery, indication for care [O75.9]  Discharge Diagnosis: Status post vaginal, spontaneous.  Pregnancy Complications: gHTN  Tubal Ligation:  no        History:  History reviewed. No pertinent past medical history.  OB History    Para Term  AB Living   1             SAB IAB Ectopic Molar Multiple Live Births                    # Outcome Date GA Lbr Blair/2nd Weight Sex Delivery Anes PTL Lv   1 Current                 Avocado  Patient Active Problem List    Diagnosis Date Noted    Labor and delivery, indication for care 2024    Vertigo 2020    Intractable migraine with aura without status migrainosus 2020    History of bipolar disorder 2019        Hospital Course:   33 y.o. , now para 1, was admitted with the above mentioned diagnosis, underwent Induction of Labor, vaginal, spontaneous. Patient postpartum course was unremarkable, with progressive advancement in diet , ambulation and toleration of oral analgesia. Patient without complaints today and desires discharge.      Vitals:    24 1830 24 2052 02/15/24 0200 02/15/24 0600   BP: 128/89 99/67 129/85 127/87   Pulse: 96 (!) 106 (!) 104 (!) 104   Resp:  20 16 16   Temp:  36.9 °C (98.5 °F) 36.5 °C (97.7 °F) 36.2 °C (97.1 °F)   TempSrc:  Temporal Temporal Temporal   SpO2:  94% 96% 100%   Weight:       Height:           Current Facility-Administered Medications   Medication Dose    ropivacaine 0.2 % (Naropin) injection      lactated ringers infusion      PRN oxytocin (PITOCIN) (20 Units/1000 mL) PRN for excessive uterine bleeding - See Admin Instr  125-999 mL/hr    docusate sodium (Colace) capsule 100 mg  100 mg    ibuprofen (Motrin) tablet 800 mg  800 mg    acetaminophen (Tylenol) tablet 1,000 mg  1,000 mg    tetanus-dipth-acell pertussis (Tdap) inj 0.5 mL  0.5 mL    measles, mumps and  rubella vaccine (Mmr) injection 0.5 mL  0.5 mL    hydrOXYzine HCl (Atarax) tablet 50 mg  50 mg    oxytocin (Pitocin) 0.02 Units/mL LR (induction of labor)  0.5-20 sabine-units/min    LR infusion      oxytocin (Pitocin) infusion (for post delivery)  125 mL/hr    sertraline (Zoloft) tablet 75 mg  75 mg    diphenhydrAMINE (Benadryl) 12.5 MG/5ML liquid 12.5 mg  12.5 mg    omeprazole (PriLOSEC) capsule 20 mg  20 mg       Exam:  Breast Exam: negative  Abdomen: Abdomen soft, non-tender. BS normal. No masses,  No organomegaly  Fundus Non Tender: yes  Incision: none  Perineum: perineum second degree  Extremity: extremities, peripheral pulses and reflexes normal, no edema, redness or tenderness in the calves or thighs     Labs:  Recent Labs     02/12/24  1701   WBC 8.1   RBC 4.32   HEMOGLOBIN 12.2   HEMATOCRIT 36.5*   MCV 84.5   MCH 28.2   MCHC 33.4   RDW 47.2   PLATELETCT 213   MPV 10.8        Activity:   Discharge to home  Pelvic Rest x 6 weeks    Assessment:  normal postpartum course  Discharge Assessment: No areas of skin breakdown/redness; surgical incision intact/healing     Follow up: .[facility] with Midwife in 5 weeks for postpartum exam  BP check on Mon with midwife or [facility]  May need to stay for GBS positive per peds     Discharge Meds:   Current Outpatient Medications   Medication Sig Dispense Refill    acetaminophen (TYLENOL) 500 MG Tab Take 2 Tablets by mouth every 6 hours as needed for Mild Pain, Moderate Pain or Fever. 60 Tablet 2    docusate sodium 100 MG Cap Take 100 mg by mouth 2 times a day as needed for Constipation. 60 Capsule 2    ibuprofen (MOTRIN) 800 MG Tab Take 1 Tablet by mouth every 8 hours as needed for Mild Pain, Moderate Pain, Headache or Inflammation. 60 Tablet 2       MALI Kim.

## 2024-02-15 NOTE — CARE PLAN
The patient is Stable - Low risk of patient condition declining or worsening    Shift Goals  Clinical Goals: BP stable; lochia WDL  Patient Goals: pain management  Family Goals: support    Progress made toward(s) clinical / shift goals:  BP ranged from 100s/60s. Lochia scant to light throughout the shift.     Patient is not progressing towards the following goals:

## 2024-02-15 NOTE — PROGRESS NOTES
Called  in regards to patient having chest pain. Asked patient if she thought she was having chest pain or if she thought she had heart burn. She stated that it was like heart burn but achy, not sharp, nor did she have some of her normal symptoms such as regurgitation. Dr. Wilkinson came up to see the patient. Ordered to cancel discharge order, stat EKG, stat troponin, and tums as needed. Will give Dr. Wilkinson the results.

## 2024-02-15 NOTE — PROGRESS NOTES
Postpartum Admission Note:    : Pt arrived from L&D via a wheelchair. Walked to bed via standby assistance, gait steady. Report received from L&D RN, Lili DALEY. Pt gave verbal consent to proceed with assessment, after describing to her what will be done. Completed assessment. Light lochia, fundus firm and at one fingerbreadth below the umbilicus. Educated pt that Pitocin is a medication to help prevent hemorrhaging by isaías the uterus. Updated the pt that this medication can cause cramping. Pt verbalized understanding and would like to continue to receive Pitocin. Pitocin running at 125 mL/hr. No signs of phlebitis or infiltration at the IV site.     : Pt and SO oriented to room. Education provided on pain management plan, supplies in restroom, periodic fundal checks, future lab draw, bed level safety, I&O sheet, Birth Certificate and EPDS sheets, and the call light system (non-urgent and urgent).    Educated pt to pull emergency cord if she experiences:  Sudden dizziness  Gushing of blood  Soaking a pad front and back within an hour  Passing a clot bigger than an egg size  Infant cyanotic    Discussed POC. Upper side rails up, bed level down, call light placed within reach. Answered all questions that pt and SO had and they verbalized understanding. Will notify me with the call light when needed.     Pt refused to receive the Influenza vaccine during her stay on Postpartum. Desires to receive the MMR vaccine, VIS and consent sheet given.     Pt states that she desires to take Atarax, which she was given during her stay in L&D. Notified pt that this order is , pt states that she would like to receive this medication during her stay on Postpartum. Updated Alejandrina Uribe, Midwife, on this information. Per Alejandrina, Atarax 50 mg PO q 6 hrs PRN ordered for anxiety.     2251: Pt states that Atarax does not make her drowsy. Atarax administered at this time.    0000: Pitocin infusion complete.      0455: Phlebotomist, Shante, at the bedside. Since pt was breastfeeding, notified Phlebotomist to come back at 0510 to draw lab. Shante verbalized understanding.     0528: Contacted Shante saying that pt is ready for her blood to be drawn. Shante verbalized understanding.     0600: Shante at the bedside to draw blood.     0700: Gave report to day shift RNDivya. Relinquished care at this time.

## 2024-02-15 NOTE — L&D DELIVERY NOTE
VAGINAL DELIVERY NOTE    The patient presented at 38w1d for IOL for gHTN after transfer from lay midwife..  Labor was augmented over the course of 48+ hours  Once in active labor she progressed quickly and only pushed for 15 min for a spontaneous vaginal delivery of LV female infant, apgars 8/9, wt pending over small midline 2nd degree laceration. The infant was delivered to the belly and cord clamped and cut. Gases were not sent. The placenta did follow spontaneously. The uterus was not explored.  Laceration(s) repaired with 3-0 vicryl in typical fashion with additional figure of 8 x2 suture at the introitus.  .  EBL 100mL.    Chayo Edwards D.O.  February 14, 2024

## 2024-02-15 NOTE — PROGRESS NOTES
0700 Report received from NOC MICHAEL Diaz, POC discussed, assumed pt care. Pt in room 222 with FOB Jez at bedside, expecting a baby girl Rosalino. Pt from [place]. Seen by Divya Laureano lay midwife who was advised to be evaluated at [facility] for elevated BP and H/A. GBS positive, receiving PCN.   IUPC and FSE in place. Epidural running Ropivicaine at 7 ml / hr. VSS.  0900 SVE 3-4/70/-3 no change from previous exam.   1230 4/70-3 continue pitocin and expected management.   1415 SVE by Dr. Edwards 5/70/-1  1515 Pt reports rectal pressure SVE 8/80/+1  1530 SVE ant lip +2   1555 Complete and pushing.   1610 Spontaneous delivery of female 8 & 9 apgars 2430 grams 18 in. 2nd degree laceration with repair. EBL 100ml.

## 2024-02-16 ENCOUNTER — PHARMACY VISIT (OUTPATIENT)
Dept: PHARMACY | Facility: MEDICAL CENTER | Age: 34
End: 2024-02-16
Payer: COMMERCIAL

## 2024-02-16 VITALS
HEART RATE: 107 BPM | OXYGEN SATURATION: 97 % | TEMPERATURE: 98.3 F | WEIGHT: 293 LBS | HEIGHT: 69 IN | DIASTOLIC BLOOD PRESSURE: 72 MMHG | SYSTOLIC BLOOD PRESSURE: 112 MMHG | RESPIRATION RATE: 18 BRPM | BODY MASS INDEX: 43.4 KG/M2

## 2024-02-16 LAB
ALT SERPL-CCNC: 13 U/L (ref 2–50)
AST SERPL-CCNC: 12 U/L (ref 12–45)
BUN SERPL-MCNC: 11 MG/DL (ref 8–22)
CREAT SERPL-MCNC: 0.67 MG/DL (ref 0.5–1.4)
ERYTHROCYTE [DISTWIDTH] IN BLOOD BY AUTOMATED COUNT: 48.7 FL (ref 35.9–50)
GFR SERPLBLD CREATININE-BSD FMLA CKD-EPI: 120 ML/MIN/1.73 M 2
HCT VFR BLD AUTO: 30.7 % (ref 37–47)
HGB BLD-MCNC: 10.2 G/DL (ref 12–16)
MCH RBC QN AUTO: 28.5 PG (ref 27–33)
MCHC RBC AUTO-ENTMCNC: 33.2 G/DL (ref 32.2–35.5)
MCV RBC AUTO: 85.8 FL (ref 81.4–97.8)
PLATELET # BLD AUTO: 143 K/UL (ref 164–446)
PMV BLD AUTO: 10.1 FL (ref 9–12.9)
RBC # BLD AUTO: 3.58 M/UL (ref 4.2–5.4)
WBC # BLD AUTO: 8.4 K/UL (ref 4.8–10.8)

## 2024-02-16 PROCEDURE — A9270 NON-COVERED ITEM OR SERVICE: HCPCS | Performed by: OBSTETRICS & GYNECOLOGY

## 2024-02-16 PROCEDURE — 85027 COMPLETE CBC AUTOMATED: CPT

## 2024-02-16 PROCEDURE — A9270 NON-COVERED ITEM OR SERVICE: HCPCS | Performed by: NURSE PRACTITIONER

## 2024-02-16 PROCEDURE — 700102 HCHG RX REV CODE 250 W/ 637 OVERRIDE(OP)

## 2024-02-16 PROCEDURE — 700102 HCHG RX REV CODE 250 W/ 637 OVERRIDE(OP): Performed by: NURSE PRACTITIONER

## 2024-02-16 PROCEDURE — RXMED WILLOW AMBULATORY MEDICATION CHARGE

## 2024-02-16 PROCEDURE — 36415 COLL VENOUS BLD VENIPUNCTURE: CPT

## 2024-02-16 PROCEDURE — 82565 ASSAY OF CREATININE: CPT

## 2024-02-16 PROCEDURE — 84460 ALANINE AMINO (ALT) (SGPT): CPT

## 2024-02-16 PROCEDURE — A9270 NON-COVERED ITEM OR SERVICE: HCPCS

## 2024-02-16 PROCEDURE — 84450 TRANSFERASE (AST) (SGOT): CPT

## 2024-02-16 PROCEDURE — 700102 HCHG RX REV CODE 250 W/ 637 OVERRIDE(OP): Performed by: OBSTETRICS & GYNECOLOGY

## 2024-02-16 PROCEDURE — 84520 ASSAY OF UREA NITROGEN: CPT

## 2024-02-16 RX ORDER — LABETALOL 100 MG/1
100 TABLET, FILM COATED ORAL 2 TIMES DAILY
Qty: 60 TABLET | Refills: 0 | Status: SHIPPED | OUTPATIENT
Start: 2024-02-16

## 2024-02-16 RX ORDER — HYDROXYZINE PAMOATE 50 MG/1
50 CAPSULE ORAL EVERY 12 HOURS PRN
Qty: 60 CAPSULE | Refills: 0 | Status: SHIPPED | OUTPATIENT
Start: 2024-02-16

## 2024-02-16 RX ORDER — LABETALOL 100 MG/1
100 TABLET, FILM COATED ORAL TWICE DAILY
Status: DISCONTINUED | OUTPATIENT
Start: 2024-02-16 | End: 2024-02-16 | Stop reason: HOSPADM

## 2024-02-16 RX ADMIN — ACETAMINOPHEN 1000 MG: 500 TABLET ORAL at 17:13

## 2024-02-16 RX ADMIN — ACETAMINOPHEN 1000 MG: 500 TABLET ORAL at 10:46

## 2024-02-16 RX ADMIN — IBUPROFEN 800 MG: 800 TABLET, FILM COATED ORAL at 10:46

## 2024-02-16 RX ADMIN — HYDROXYZINE HYDROCHLORIDE 50 MG: 25 TABLET, FILM COATED ORAL at 10:54

## 2024-02-16 RX ADMIN — LABETALOL HYDROCHLORIDE 100 MG: 100 TABLET, FILM COATED ORAL at 10:46

## 2024-02-16 RX ADMIN — OMEPRAZOLE 20 MG: 20 CAPSULE, DELAYED RELEASE ORAL at 17:14

## 2024-02-16 ASSESSMENT — PAIN DESCRIPTION - PAIN TYPE: TYPE: ACUTE PAIN

## 2024-02-16 NOTE — DISCHARGE SUMMARY
Discharge Summary:     Date of Admission: 2024  Date of Discharge: 24      Admitting diagnosis:    1. Pregnancy @ 38w3d  2. Gestational HTN  3. GBS Positive       Discharge Diagnosis:   1. Status post vaginal, spontaneous.  2. Same     History reviewed. No pertinent past medical history.  OB History    Para Term  AB Living   1             SAB IAB Ectopic Molar Multiple Live Births                    # Outcome Date GA Lbr Blair/2nd Weight Sex Delivery Anes PTL Lv   1 Current              Past Surgical History:   Procedure Laterality Date    ANKLE TOTAL Left 2014    ANKLE TOTAL Left 2013     Avocado    Patient Active Problem List   Diagnosis    History of bipolar disorder    Vertigo    Intractable migraine with aura without status migrainosus       Hospital Course:   Pt is a 33 y.o. now  who presented on 2024 for IOL for gHTN. Labor induction performed with cervadil, cytotec and pitocin. Epidural anesthesia was utilized with good effect on pain. Single female infant was delivered via  at 38w3d. Apgars 8, 9 at 1 and 5 minutes respectively.  ml.     Postpartum course notable for early ambulation, well managed pain, tolerance of diet, spontaneous voiding, and appropriate feeding of infant. She has remained afebrile and blood pressure has been well controlled. All maternal questions and concerns addressed    Physical Exam:  Temp:  [36.2 °C (97.1 °F)-37.4 °C (99.4 °F)] 36.8 °C (98.3 °F)  Pulse:  [] 107  Resp:  [18-20] 18  BP: (112-165)/() 112/72  SpO2:  [94 %-98 %] 97 %  Physical Exam  General: well appearing, no apparent distress  Abdomen: soft, nontender, nondistended  Fundus: firm at level below umbilicus  Incision: not applicable, (vaginal delivery)  Perineum: Deferred  Extremities: symmetric, no peripheral edema, calves nontender    Current Facility-Administered Medications   Medication Dose    labetalol (Normodyne) tablet 100 mg  100 mg    calcium  carbonate (Tums) chewable tab 500 mg  500 mg    lactated ringers infusion      PRN oxytocin (PITOCIN) (20 Units/1000 mL) PRN for excessive uterine bleeding - See Admin Instr  125-999 mL/hr    docusate sodium (Colace) capsule 100 mg  100 mg    ibuprofen (Motrin) tablet 800 mg  800 mg    acetaminophen (Tylenol) tablet 1,000 mg  1,000 mg    tetanus-dipth-acell pertussis (Tdap) inj 0.5 mL  0.5 mL    measles, mumps and rubella vaccine (Mmr) injection 0.5 mL  0.5 mL    hydrOXYzine HCl (Atarax) tablet 50 mg  50 mg    sertraline (Zoloft) tablet 75 mg  75 mg    diphenhydrAMINE (Benadryl) 12.5 MG/5ML liquid 12.5 mg  12.5 mg    omeprazole (PriLOSEC) capsule 20 mg  20 mg       Recent Labs     02/15/24  0600 02/16/24  0653   WBC 7.8 8.4   RBC 3.71* 3.58*   HEMOGLOBIN 10.6* 10.2*   HEMATOCRIT 32.0* 30.7*   MCV 86.3 85.8   MCH 28.6 28.5   MCHC 33.1 33.2   RDW 48.3 48.7   PLATELETCT 145* 143*   MPV 10.9 10.1         Activity/ Discharge Instructions::   Discharge to home  Pelvic Rest x 6 weeks  No heavy lifting x4 weeks  Call or come to ED for: heavy vaginal bleeding, fever >100.4, severe abdominal pain, severe headache, chest pain, shortness of breath,  N/V, incisional drainage, or other concerns.    #gHTN  - Cont. Labetolol 100 BID  - F/u in office in 1 week for BP check    #Hx Anxiety/Depression  - Cont. Zoloft 75 mg qHS  - Hydroxyzine 50mg q12hrs PRN  - F/u with outpatient psychiatrist within 1 week        Follow up:  [facility] in 1 weeks for BP check    Discharge Meds:   Current Outpatient Medications   Medication Sig Dispense Refill    labetalol (NORMODYNE) 100 MG Tab Take 1 Tablet by mouth 2 times a day. 60 Tablet 0    hydrOXYzine pamoate (VISTARIL) 50 MG Cap Take 1 Capsule by mouth every 12 hours as needed for Itching. 60 Capsule 0    acetaminophen (TYLENOL) 500 MG Tab Take 2 Tablets by mouth every 6 hours as needed for Mild Pain, Moderate Pain or Fever. 60 Tablet 2    docusate sodium 100 MG Cap Take 100 mg by  mouth 2 times a day as needed for Constipation. 60 Capsule 2    ibuprofen (MOTRIN) 800 MG Tab Take 1 Tablet by mouth every 8 hours as needed for Mild Pain, Moderate Pain, Headache or Inflammation. 60 Tablet 2           Rui Wilkinson M.D.   PGY-1, [facility]

## 2024-02-16 NOTE — PROGRESS NOTES
Obstetrics & Gynecology Post-Delivery Progress Note    Date of Service: 24      33 y.o.  s/p vaginal, spontaneous  Delivery date: 24      Subjective  Pt reports she has a mild HA since this morning. Also reports she has been feeling anxiety over her elevated blood pressure. Had an episode overnight where she felt depressed, had passive SI. Denies SI this morning and reports her mood is much better after getting some sleep. She declines an inpatient psych consult as she is already established with a psychiatrist outpatient and is going to call her today.     Pain: Well controlled  Bleeding: lochia minimal  Tolerating PO: yes  Voiding: without difficulty  Ambulating: yes  Passing flatus: Yes  Feeding: breastfeeding well      Objective  24hr VS:  Temp:  [36.2 °C (97.1 °F)-36.7 °C (98.1 °F)] 36.7 °C (98.1 °F)  Pulse:  [] 79  Resp:  [18-20] 18  BP: (125-154)/(80-97) 154/92  SpO2:  [94 %-98 %] 98 %    Physical Exam  Gen: well appearing, no apparent distress, resting comfortably in bed  CV: rrr, no m/r/g  Resp: CTAB, symmetric breath sounds  Abd: soft, nontender, nondistended  Fundus: firm, at umbilicus, and nontender  Incision: not applicable, (vaginal delivery)  Ext: symmetric, no peripheral edema, calves nontender    Labs:   Recent Labs     02/15/24  0600 24  0653   WBC 7.8 8.4   RBC 3.71* 3.58*   HEMOGLOBIN 10.6* 10.2*   HEMATOCRIT 32.0* 30.7*   MCV 86.3 85.8   MCH 28.6 28.5   RDW 48.3 48.7   PLATELETCT 145* 143*   MPV 10.9 10.1         Medications  labetalol, 100 mg, Oral, TWICE DAILY  sertraline, 75 mg, Oral, Nightly  diphenhydrAMINE, 12.5 mg, Oral, Nightly  omeprazole, 20 mg, Oral, DAILY      PRN medications: calcium carbonate, LR, oxytocin, docusate sodium, ibuprofen, acetaminophen, tetanus-dipth-acell pertussis, measles, mumps and rubella vaccine, hydrOXYzine HCl      Assessment/Plan  Kelsey Penrose is a 33 y.o.yo  postpartum day #2  s/p vaginal, spontaneous    #Elevated BP  - Start  Labetalol 100 BID  - Obtain repeat [facility] labs  - If BP normalizes by this afternoon/evening, patient may possibly discharge and f/u in clinic within 1 week for BP check    #Hx BiPolar  > EPDS 12 this morning  - Pt declines inpt psych consult  - Denies HI/SI at this time  - She is going to call her psychiatrist today for recommendations     - Post care: meeting all goals  - Pain: controlled  - Rh-, Rubella Non-Immune  - Method of Feeding: plans to breastfeed  - Method of Contraception: vasectomy  VTE prophylaxis: none indicated    - Disposition: likely home PPD2-3 (pt may go home later this evening if BP normalizes)       Rui Wilkinson M.D.  PGY-1, [facility]
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
## 2024-02-16 NOTE — PROGRESS NOTES
Called the provide to discuss the patient's discharge plan. Patient's blood pressures have stabilized. Patient will be discharged on labetalol twice a day and hydroxyzine as needed.   came and talked to the patient about a plan to discharge.

## 2024-02-16 NOTE — PROGRESS NOTES
0500 RN notified Isa HODGE of Pt having two elevated BP in 24 hours as per orders( 146/92 and 149/97). Isa HODGE requested BP be retaken. BP at 0554 was 164/100. Isa HODGE to input orders for BP medication. No other orders at this time.

## 2024-02-16 NOTE — PROGRESS NOTES
Report received from Divya RODRIGUEZ. Pt assessment complete. Reviewed POC for this evening. Call light is within reach.     2100: report given to Paz RODRIGUEZ.

## 2024-02-16 NOTE — CARE PLAN
The patient is Stable - Low risk of patient condition declining or worsening    Shift Goals  Clinical Goals: clinically stable  Patient Goals: pain management  Family Goals: support    Progress made toward(s) clinical / shift goals:  Patient has been clinically stable    Patient is not progressing towards the following goals:

## 2024-02-17 NOTE — PROGRESS NOTES
Received report and assumed care of patient. Patient was requesting donor milk for infant, otherwise no other needs at this time.

## 2024-02-17 NOTE — DISCHARGE INSTRUCTIONS
PATIENT DISCHARGE EDUCATION INSTRUCTION SHEET    REASONS TO CALL YOUR OBSTETRICIAN  Persistent fever, shaking, chills (Temperature higher than 100.4) may indicate you have an infection  Heavy bleeding: soaking more than 1 pad per hour; Passing clots an egg-sized clot or bigger may mean you have an postpartum hemorrhage  Foul odor from vagina or bad smelling or discolored discharge or blood  Breast infection (Mastitis symptoms); breast pain, chills, fever, redness or red streaks, may feel flu like symptoms  Urinary pain, burning or frequency  Incision that is not healing, increased redness, swelling, tenderness or pain, or any pus from episiotomy or  site may mean you have an infection  Redness, swelling, warmth, or painful to touch in the calf area of your leg may mean you have a blood clot  Severe or intensified depression, thoughts or feelings of wanting to hurt yourself or someone else   Pain in chest, obstructed breathing or shortness of breath (trouble catching your breath) may mean you are having a postpartum complication. Call your provider immediately   Headache that does not get better, even after taking medicine, a bad headache with vision changes or pain in the upper right area of your belly may mean you have high blood pressure or post birth preeclampsia. Call your provider immediately    HAND WASHING  All family and friends should wash their hands:  Before and after holding the baby  Before feeding the baby  After using the restroom or changing the baby's diaper    WOUND CARE  Ask your physician for additional care instructions. In general:  Episiotomy/Laceration  May use latrell-spray bottle, witch hazel pads and dermaplast spray for comfort  Use latrell-spray bottle after urinating to cleanse perineal area  To prevent burning during urination spray latrell-water bottle on labial area   Pat perineal area dry until episiotomy/laceration is healed  Continue to use latrell-bottle until bleeding stops as  needed  If have a 2nd degree laceration or greater, a Sitz bath can offer relief from soreness, burning, and inflammation   Sitz Bath   Sit in 6 inches of warm water and soak laceration as needed until the laceration heals    VAGINAL CARE AND BLEEDING  Nothing inside vagina for 6 weeks:   No sexual intercourse, tampons or douching  Bleeding may continue for 2-4 weeks. Amount and color may vary  Soaking 1 pad or more in an hour for several hours is considered heavy bleeding  Passing large egg sized blood clots can be concerning  If you feel like you have heavy bleeding or are having increasing amount of blood clots call your Obstetrician immediately  If you begin feeling faint upon standing, feeling sick to your stomach, have clammy skin, a really fast heartbeat, have chills, start feeling confused, dizzy, sleepy or weak, or feeling like you're going to faint call your Obstetrician immediately    HYPERTENSION   Preeclampsia or gestational hypertension are types of high blood pressure that only pregnant women can get. It is important for you to be aware of symptoms to seek early intervention and treatment. If you have any of these symptoms immediately call your Obstetrician    Vision changes or blurred vision   Severe headache or pain that is unrelieved with medication and will not go away  Persistent pain in upper abdomen or shoulder   Increased swelling of face, feet, or hands  Difficulty breathing or shortness of breath at rest  Urinating less than usual    URINATION AND BOWEL MOVEMENTS  Eating more fiber (bran cereal, fruits, and vegetables) and drinking plenty of fluids will help to avoid constipation  Urinary frequency and urgency after childbirth is normal  If you experience any urinary pain, burning or frequency call your provider    BIRTH CONTROL  It is possible to become pregnant at any time after delivery and while breastfeeding  Plan to discuss a method of birth control with your physician at your post  "delivery follow up visit    POSTPARTUM BLUES  During the first few days after birth, you may experience a sense of the \"blues\" which may include impatience, irritability or even crying. These feelings come and go quickly. However, as many as 1 in 10 women experience emotional symptoms known as postpartum depression.     POSTPARTUM DEPRESSION    May start as early as the second or third day after delivery or take several weeks or months to develop. Symptoms of \"blues\" are present, but are more intense: Crying spells; loss of appetite; feelings of hopelessness or loss of control; fear of touching the baby; over concern or no concern at all about the baby; little or no concern about your own appearance/caring for yourself; and/or inability to sleep or excessive sleeping. Contact your Obstetrician if you are experiencing any of these symptoms     PREVENTING SHAKEN BABY  If you are angry or stressed, PUT THE BABY IN THE CRIB, step away, take some deep breaths, and wait until you are calm to care for the baby. DO NOT SHAKE THE BABY. You are not alone, call a supporter for help.  [facility] 24/7 crisis call line ([phone]) or ([phone])  You can also text them, text \"ANSWER\" (005843)  "

## 2024-02-17 NOTE — CARE PLAN
The patient is Stable - Low risk of patient condition declining or worsening    Shift Goals  Clinical Goals: clinically stable  Patient Goals: pain management  Family Goals: support    Progress made toward(s) clinical / shift goals:  Patient is clinically stable    Patient is not progressing towards the following goals:

## 2024-12-10 ENCOUNTER — OFFICE VISIT (OUTPATIENT)
Dept: MEDICAL GROUP | Facility: PHYSICIAN GROUP | Age: 34
End: 2024-12-10
Payer: COMMERCIAL

## 2024-12-10 VITALS
HEIGHT: 69 IN | OXYGEN SATURATION: 98 % | SYSTOLIC BLOOD PRESSURE: 130 MMHG | TEMPERATURE: 97.8 F | WEIGHT: 283 LBS | BODY MASS INDEX: 41.92 KG/M2 | HEART RATE: 88 BPM | RESPIRATION RATE: 14 BRPM | DIASTOLIC BLOOD PRESSURE: 84 MMHG

## 2024-12-10 DIAGNOSIS — F41.9 ANXIETY AND DEPRESSION: ICD-10-CM

## 2024-12-10 DIAGNOSIS — F32.A ANXIETY AND DEPRESSION: ICD-10-CM

## 2024-12-10 DIAGNOSIS — Z11.3 SCREEN FOR STD (SEXUALLY TRANSMITTED DISEASE): ICD-10-CM

## 2024-12-10 DIAGNOSIS — F90.9 ATTENTION DEFICIT HYPERACTIVITY DISORDER (ADHD), UNSPECIFIED ADHD TYPE: ICD-10-CM

## 2024-12-10 DIAGNOSIS — R03.0 ELEVATED BP WITHOUT DIAGNOSIS OF HYPERTENSION: ICD-10-CM

## 2024-12-10 DIAGNOSIS — Z23 NEED FOR VACCINATION: ICD-10-CM

## 2024-12-10 DIAGNOSIS — Z00.00 PHYSICAL EXAM, ANNUAL: ICD-10-CM

## 2024-12-10 DIAGNOSIS — E66.813 CLASS 3 SEVERE OBESITY DUE TO EXCESS CALORIES WITHOUT SERIOUS COMORBIDITY WITH BODY MASS INDEX (BMI) OF 40.0 TO 44.9 IN ADULT (HCC): ICD-10-CM

## 2024-12-10 DIAGNOSIS — E66.01 CLASS 3 SEVERE OBESITY DUE TO EXCESS CALORIES WITHOUT SERIOUS COMORBIDITY WITH BODY MASS INDEX (BMI) OF 40.0 TO 44.9 IN ADULT (HCC): ICD-10-CM

## 2024-12-10 DIAGNOSIS — Z11.59 ENCOUNTER FOR HEPATITIS C SCREENING TEST FOR LOW RISK PATIENT: ICD-10-CM

## 2024-12-10 PROCEDURE — 99214 OFFICE O/P EST MOD 30 MIN: CPT | Mod: 25 | Performed by: PHYSICIAN ASSISTANT

## 2024-12-10 PROCEDURE — 3079F DIAST BP 80-89 MM HG: CPT | Performed by: PHYSICIAN ASSISTANT

## 2024-12-10 PROCEDURE — 3075F SYST BP GE 130 - 139MM HG: CPT | Performed by: PHYSICIAN ASSISTANT

## 2024-12-10 PROCEDURE — 90471 IMMUNIZATION ADMIN: CPT | Performed by: PHYSICIAN ASSISTANT

## 2024-12-10 PROCEDURE — 90656 IIV3 VACC NO PRSV 0.5 ML IM: CPT | Performed by: PHYSICIAN ASSISTANT

## 2024-12-10 RX ORDER — LAMOTRIGINE 25 MG/1
TABLET ORAL
COMMUNITY
Start: 2024-10-23

## 2024-12-10 RX ORDER — SEMAGLUTIDE 0.25 MG/.5ML
0.25 INJECTION, SOLUTION SUBCUTANEOUS
Qty: 2 ML | Refills: 0 | Status: SHIPPED | OUTPATIENT
Start: 2024-12-10

## 2024-12-10 RX ORDER — LISDEXAMFETAMINE DIMESYLATE 20 MG/1
CAPSULE ORAL
COMMUNITY
Start: 2024-10-11

## 2024-12-10 RX ORDER — NIFEDIPINE 30 MG/1
30 TABLET, EXTENDED RELEASE ORAL DAILY
COMMUNITY

## 2024-12-10 RX ORDER — DEXTROAMPHETAMINE SACCHARATE, AMPHETAMINE ASPARTATE, DEXTROAMPHETAMINE SULFATE AND AMPHETAMINE SULFATE 3.75; 3.75; 3.75; 3.75 MG/1; MG/1; MG/1; MG/1
1 TABLET ORAL 2 TIMES DAILY
COMMUNITY
Start: 2024-10-20

## 2024-12-10 ASSESSMENT — FIBROSIS 4 INDEX: FIB4 SCORE: 0.38

## 2024-12-10 NOTE — PROGRESS NOTES
CC:    Chief Complaint   Patient presents with    [facility]     Bp, weight loss and pmdd  concerns        HISTORY OF THE PRESENT ILLNESS: Patient is a 34 y.o. female presenting to [facility] primary care     BP runs around 130-140/85-95 at home. Currently on nifedipine.   Taking adderall XR for ADHD. Stopped taking it in last month due to BP concerns. Seen by psychiatry.   Hx of addiction to benzos.  Last pap smear in 2024 and no abnormal findings.   Pt found that her insurance would cover wegovy if it can be demonstrated that she has tried weight loss efforts. Used to be very active, going to the gym 3x per week.     No problem-specific Assessment & Plan notes found for this encounter.    Allergies: Avocado    Current Outpatient Medications Ordered in Epic   Medication Sig Dispense Refill    amphetamine-dextroamphetamine (ADDERALL) 15 MG tablet Take 1 Tablet by mouth 2 times a day.      NIFEdipine SR (PROCADIA-XL) 30 MG tablet Take 30 mg by mouth every day.      hydrOXYzine pamoate (VISTARIL) 50 MG Cap Take 1 Capsule by mouth every 12 hours as needed for Itching. 60 Capsule 0    acetaminophen (TYLENOL) 500 MG Tab Take 2 Tablets by mouth every 6 hours as needed for Mild Pain, Moderate Pain or Fever. 60 Tablet 2    ibuprofen (MOTRIN) 800 MG Tab Take 1 Tablet by mouth every 8 hours as needed for Mild Pain, Moderate Pain, Headache or Inflammation. 60 Tablet 2    sertraline (ZOLOFT) 50 MG Tab Take 75 mg by mouth every day.      lamoTRIgine (LAMICTAL) 25 MG Tab  (Patient not taking: Reported on 12/10/2024)      lisdexamfetamine (VYVANSE) 20 MG Cap  (Patient not taking: Reported on 12/10/2024)      labetalol (NORMODYNE) 100 MG Tab Take 1 Tablet by mouth 2 times a day. (Patient not taking: Reported on 12/10/2024) 60 Tablet 0    docusate sodium 100 MG Cap Take 100 mg by mouth 2 times a day as needed for Constipation. (Patient not taking: Reported on 12/10/2024) 60 Capsule 2     No current Epic-ordered  facility-administered medications on file.       History reviewed. No pertinent past medical history.    Past Surgical History:   Procedure Laterality Date    ANKLE TOTAL Left 2014    ANKLE TOTAL Left 2013       Social History     Tobacco Use    Smoking status: Former     Current packs/day: 0.00     Types: Cigarettes     Quit date: 2023     Years since quittin.5    Smokeless tobacco: Never   Vaping Use    Vaping status: Never Used   Substance Use Topics    Alcohol use: Not Currently     Comment: OCC    Drug use: No       Social History     Social History Narrative    Not on file       History reviewed. No pertinent family history.    ROS:     - Constitutional: Negative for fever, chills, unexpected weight change, and fatigue/generalized weakness.     - HEENT: Negative for + headaches, vision changes, hearing changes, ear pain, ear discharge, rhinorrhea, sinus congestion, sore throat, and neck pain.      - Respiratory: Negative for cough, sputum production, chest congestion, dyspnea, wheezing, and crackles.      - Cardiovascular: Negative for chest pain, palpitations, orthopnea, and bilateral lower extremity edema.     - Gastrointestinal: Negative for heartburn, nausea, vomiting, abdominal pain, hematochezia, melena, diarrhea, constipation, and greasy/foul-smelling stools.     - Genitourinary: Negative for dysuria, polyuria, hematuria, pyuria, urinary urgency, and urinary incontinence.     - Musculoskeletal: Negative for myalgias, back pain, and joint pain.     - Skin: Negative for rash, itching, cyanotic skin color change.     - Neurological: Negative for dizziness, tingling, tremors, focal sensory deficit, focal weakness and headaches.     - Endo/Heme/Allergies: Does not bruise/bleed easily.     - Psychiatric/Behavioral: Negative for + depression, suicidal/homicidal ideation and memory loss.            .      Exam: /84   Pulse 88   Temp 36.6 °C (97.8 °F) (Temporal)   Resp 14   Ht 1.753  "m (5' 9\")   Wt (!) 128 kg (283 lb)   SpO2 98%  Body mass index is 41.79 kg/m².    General: Normal appearing. No acute distress.  Skin: Warm and dry.  No obvious lesions.  HEENT: Normocephalic. Eyes conjunctiva clear lids without ptosis, ears normal shape and contour  Cardiovascular: Regular rate and rhythm without murmur.   Respiratory: Clear to auscultation bilaterally, no rhonchi wheezing or rales.  Neurologic: Grossly nonfocal, A&O x3, gait normal,  Musculoskeletal: No deformity or swelling.   Extremities: No extremity cyanosis, clubbing, or edema.  Psych: Normal mood and affect. Judgment and insight is normal.    Please note that this dictation was created using voice recognition software. I have made every reasonable attempt to correct obvious errors, but I expect that there are errors of grammar and possibly content that I did not discover before finalizing the note.      Assessment/Plan    1. Need for vaccination    - INFLUENZA VACCINE TRI INJ (PF)     2. Physical exam, annual  Labs printed  - CBC WITH DIFFERENTIAL; Future  - Comp Metabolic Panel; Future  - HEMOGLOBIN A1C; Future  - Lipid Profile; Future  - TSH WITH REFLEX TO FT4; Future  - VITAMIN D,25 HYDROXY (DEFICIENCY); Future    3. Encounter for hepatitis C screening test for low risk patient    - HEP C VIRUS ANTIBODY; Future    4. Screen for STD (sexually transmitted disease)    - HIV AG/AB COMBO ASSAY SCREENING; Future    5. Class 3 severe obesity due to excess calories without serious comorbidity with body mass index (BMI) of 40.0 to 44.9 in adult (HCC)  Start on wegovy. Discussed challenges with this medication.   - Semaglutide (WEGOVY) 0.25 MG/0.5ML Solution Auto-injector Pen-injector; Inject 0.5 mL under the skin every 7 days.  Dispense: 2 mL; Refill: 0    6. Attention deficit hyperactivity disorder (ADHD), unspecified ADHD type  F/u with psychiatry.     7. Anxiety and depression      8. Elevated BP without diagnosis of hypertension  I " instructed her to take her BP at home for several weeks without BP medication and instructed to take her adderall. Will review at next visit and treat as needed.

## 2024-12-11 ENCOUNTER — TELEPHONE (OUTPATIENT)
Dept: MEDICAL GROUP | Facility: PHYSICIAN GROUP | Age: 34
End: 2024-12-11
Payer: COMMERCIAL

## 2025-01-13 ENCOUNTER — OFFICE VISIT (OUTPATIENT)
Dept: MEDICAL GROUP | Facility: PHYSICIAN GROUP | Age: 35
End: 2025-01-13
Payer: COMMERCIAL

## 2025-01-13 VITALS
OXYGEN SATURATION: 96 % | WEIGHT: 271 LBS | DIASTOLIC BLOOD PRESSURE: 80 MMHG | HEART RATE: 70 BPM | RESPIRATION RATE: 14 BRPM | BODY MASS INDEX: 40.14 KG/M2 | TEMPERATURE: 97.3 F | HEIGHT: 69 IN | SYSTOLIC BLOOD PRESSURE: 100 MMHG

## 2025-01-13 DIAGNOSIS — E66.813 CLASS 3 SEVERE OBESITY DUE TO EXCESS CALORIES WITHOUT SERIOUS COMORBIDITY WITH BODY MASS INDEX (BMI) OF 40.0 TO 44.9 IN ADULT (HCC): ICD-10-CM

## 2025-01-13 DIAGNOSIS — E66.01 CLASS 3 SEVERE OBESITY DUE TO EXCESS CALORIES WITHOUT SERIOUS COMORBIDITY WITH BODY MASS INDEX (BMI) OF 40.0 TO 44.9 IN ADULT (HCC): ICD-10-CM

## 2025-01-13 DIAGNOSIS — F90.2 ATTENTION DEFICIT HYPERACTIVITY DISORDER (ADHD), COMBINED TYPE: ICD-10-CM

## 2025-01-13 DIAGNOSIS — Z00.00 PHYSICAL EXAM, ANNUAL: ICD-10-CM

## 2025-01-13 DIAGNOSIS — R03.0 ELEVATED BP WITHOUT DIAGNOSIS OF HYPERTENSION: ICD-10-CM

## 2025-01-13 PROCEDURE — 99395 PREV VISIT EST AGE 18-39: CPT | Performed by: PHYSICIAN ASSISTANT

## 2025-01-13 PROCEDURE — 3074F SYST BP LT 130 MM HG: CPT | Performed by: PHYSICIAN ASSISTANT

## 2025-01-13 PROCEDURE — 3079F DIAST BP 80-89 MM HG: CPT | Performed by: PHYSICIAN ASSISTANT

## 2025-01-13 ASSESSMENT — FIBROSIS 4 INDEX: FIB4 SCORE: 0.38

## 2025-01-13 NOTE — PROGRESS NOTES
CC:    Chief Complaint   Patient presents with    Annual Exam     PE        HISTORY OF THE PRESENT ILLNESS:  34 y.o. female presenting for annual physical exam.   Pt has been tracking her BP at home. Systolic has been normal but diastolic has been mildly elevated. Has also been taking her adderall as well.   Patient has lost 12 pounds since her last visit.  She attributes this to taking Adderall.      No problem-specific Assessment & Plan notes found for this encounter.    Allergies: Avocado    Current Outpatient Medications Ordered in Epic   Medication Sig Dispense Refill    Prenatal Vit-Fe Fumarate-FA (PRENATAL PO) Take  by mouth.      amphetamine-dextroamphetamine (ADDERALL) 15 MG tablet Take 1 Tablet by mouth 2 times a day.      hydrOXYzine pamoate (VISTARIL) 50 MG Cap Take 1 Capsule by mouth every 12 hours as needed for Itching. 60 Capsule 0    acetaminophen (TYLENOL) 500 MG Tab Take 2 Tablets by mouth every 6 hours as needed for Mild Pain, Moderate Pain or Fever. 60 Tablet 2    ibuprofen (MOTRIN) 800 MG Tab Take 1 Tablet by mouth every 8 hours as needed for Mild Pain, Moderate Pain, Headache or Inflammation. 60 Tablet 2    sertraline (ZOLOFT) 50 MG Tab Take 75 mg by mouth every day.       No current [facility]-ordered facility-administered medications on file.       Past Medical History:   Diagnosis Date    ADHD     Hypertension     PMDD (premenstrual dysphoric disorder)        Past Surgical History:   Procedure Laterality Date    ANKLE TOTAL Left 2014    ANKLE TOTAL Left 2013       Social History     Tobacco Use    Smoking status: Former     Current packs/day: 0.00     Types: Cigarettes     Quit date: 2023     Years since quittin.6    Smokeless tobacco: Never    Tobacco comments:     Smoke .25 ppd x 11 years. Quit 2 years ago.    Vaping Use    Vaping status: Never Used   Substance Use Topics    Alcohol use: Not Currently     Comment: OCC    Drug use: Not Currently     Types: Marijuana  "      Social History     Social History Narrative    Not on file       Family History   Problem Relation Age of Onset    Cancer Maternal Aunt         Uterine CA       ROS:     - Constitutional: Negative for fever, chills, unexpected weight change, and fatigue/generalized weakness.     - HEENT: Negative for headaches, vision changes, hearing changes, ear pain, ear discharge, rhinorrhea, sinus congestion, sore throat, and neck pain.      - Respiratory: Negative for cough, sputum production, chest congestion, dyspnea, wheezing, and crackles.      - Cardiovascular: Negative for chest pain, palpitations, orthopnea, and bilateral lower extremity edema.            Health Maintenance  Cholesterol Screening: Fasting lipid panel  Diabetes Screening: Fasting blood sugar  Exercise: Regular exercise.   Substance Abuse: None  Safe in relationship Yes     Cancer screening  Colorectal Cancer Screening: N/A   Cervical Cancer Screenin   Breast Cancer Screening: N/A       Exam: /80   Pulse 70   Temp 36.3 °C (97.3 °F) (Temporal)   Resp 14   Ht 1.753 m (5' 9\")   Wt 123 kg (271 lb)   SpO2 96%  Body mass index is 40.02 kg/m².    General: Normal appearing. No distress.  HEENT: Normocephalic. Eyes conjunctiva clear lids without ptosis, pupils equal and reactive to light accommodation, ears normal shape and contour, canals are clear bilaterally, tympanic membranes are benign, nasal mucosa benign, oropharynx is without erythema, edema or exudates.   Neck: Supple without JVD or bruit. No thyromegaly. No cervical lymphadenopathy  Pulmonary: Clear to ausculation.  Normal effort. No rales, ronchi, or wheezing.  Cardiovascular: Regular rate and rhythm without murmur, gallops or rubs  Neurologic: Grossly nonfocal, gait normal, normal muscle tone  Skin: Warm and dry.  No obvious lesions.  Musculoskeletal: No extremity cyanosis, clubbing, or edema. No deformity  Psych: Normal mood and affect. Alert and oriented x3. Judgment and " insight is normal.    Please note that this dictation was created using voice recognition software. I have made every reasonable attempt to correct obvious errors, but I expect that there are errors of grammar and possibly content that I did not discover before finalizing the note.      Assessment/Plan    1. Physical exam, annual  PE conducted    2. Class 3 severe obesity due to excess calories without serious comorbidity with body mass index (BMI) of 40.0 to 44.9 in adult (HCC)  Improving. Continue to monitor    3. Attention deficit hyperactivity disorder (ADHD), combined type  Continue adderall.     4. Elevated BP without diagnosis of hypertension  Will hold off on treatment for now. Continue to trend and recheck in six months